# Patient Record
Sex: MALE | Race: WHITE | NOT HISPANIC OR LATINO | Employment: FULL TIME | URBAN - METROPOLITAN AREA
[De-identification: names, ages, dates, MRNs, and addresses within clinical notes are randomized per-mention and may not be internally consistent; named-entity substitution may affect disease eponyms.]

---

## 2023-03-12 PROBLEM — M54.2 NECK PAIN: Status: ACTIVE | Noted: 2023-03-12

## 2023-03-12 PROBLEM — R80.9 PROTEINURIA: Status: ACTIVE | Noted: 2023-03-12

## 2023-03-12 PROBLEM — G47.00 INSOMNIA: Status: ACTIVE | Noted: 2023-03-12

## 2023-03-12 PROBLEM — D23.9 DYSPLASTIC NEVI: Status: ACTIVE | Noted: 2023-03-12

## 2023-03-12 PROBLEM — F41.9 ANXIETY: Status: ACTIVE | Noted: 2023-03-12

## 2023-03-12 PROBLEM — M62.838 CERVICAL PARASPINAL MUSCLE SPASM: Status: ACTIVE | Noted: 2023-03-12

## 2023-03-12 PROBLEM — F32.A MILD DEPRESSION: Status: ACTIVE | Noted: 2023-03-12

## 2023-03-12 PROBLEM — E78.5 HYPERLIPIDEMIA: Status: ACTIVE | Noted: 2023-03-12

## 2023-03-12 PROBLEM — E88.09 PROTEINS SERUM PLASMA LOW: Status: ACTIVE | Noted: 2023-03-12

## 2023-03-12 RX ORDER — PRAVASTATIN SODIUM 80 MG/1
1 TABLET ORAL DAILY
COMMUNITY
Start: 2019-06-21 | End: 2023-03-14 | Stop reason: SDDI

## 2023-03-14 ENCOUNTER — OFFICE VISIT (OUTPATIENT)
Dept: PRIMARY CARE | Facility: CLINIC | Age: 53
End: 2023-03-14
Payer: COMMERCIAL

## 2023-03-14 VITALS
RESPIRATION RATE: 18 BRPM | TEMPERATURE: 97 F | HEART RATE: 95 BPM | HEIGHT: 73 IN | WEIGHT: 172.2 LBS | BODY MASS INDEX: 22.82 KG/M2 | SYSTOLIC BLOOD PRESSURE: 137 MMHG | DIASTOLIC BLOOD PRESSURE: 92 MMHG | OXYGEN SATURATION: 98 %

## 2023-03-14 DIAGNOSIS — N45.3 ORCHITIS AND EPIDIDYMITIS: Primary | ICD-10-CM

## 2023-03-14 LAB
POC BILIRUBIN, URINE: NEGATIVE
POC BLOOD, URINE: NEGATIVE
POC COLOR, URINE: YELLOW
POC GLUCOSE, URINE: NEGATIVE MG/DL
POC KETONES, URINE: NEGATIVE MG/DL
POC NITRITE,URINE: NEGATIVE
POC PH, URINE: 5.5 PH
POC PROTEIN, URINE: NEGATIVE MG/DL
POC SPECIFIC GRAVITY, URINE: >=1.03
POC UROBILINOGEN, URINE: 2 EU/DL

## 2023-03-14 PROCEDURE — 81003 URINALYSIS AUTO W/O SCOPE: CPT | Performed by: FAMILY MEDICINE

## 2023-03-14 PROCEDURE — 99214 OFFICE O/P EST MOD 30 MIN: CPT | Performed by: FAMILY MEDICINE

## 2023-03-14 PROCEDURE — 1036F TOBACCO NON-USER: CPT | Performed by: FAMILY MEDICINE

## 2023-03-14 RX ORDER — SULFAMETHOXAZOLE AND TRIMETHOPRIM 800; 160 MG/1; MG/1
1 TABLET ORAL 2 TIMES DAILY
Qty: 60 TABLET | Refills: 0 | Status: SHIPPED | OUTPATIENT
Start: 2023-03-14 | End: 2023-04-18 | Stop reason: SINTOL

## 2023-03-14 ASSESSMENT — ENCOUNTER SYMPTOMS
DYSURIA: 0
CHILLS: 1
ABDOMINAL PAIN: 1
FEVER: 1

## 2023-03-14 NOTE — PROGRESS NOTES
"Subjective   Patient ID: Thomas Muñoz is a 52 y.o. male who presents for Testicle Pain (Does a lot of heavy lifting , had low fever for 5 days , swelling in right testicle and pain /).    Testicle Pain  The patient's primary symptoms include testicular pain. The current episode started 1 to 4 weeks ago. The problem occurs daily. The problem has been gradually improving. The pain is mild. Associated symptoms include abdominal pain, chills and a fever. Pertinent negatives include no dysuria, painful intercourse, urgency or urinary retention. The testicular pain affects the right testicle. There is swelling in the right testicle. The symptoms are aggravated by tactile pressure. He has tried OTC analgesics for the symptoms. The treatment provided mild relief. He is sexually active. There is no history of chlamydia, gonorrhea, HIV, kidney stones, prostatitis or syphilis.        Review of Systems   Constitutional:  Positive for chills and fever.   Gastrointestinal:  Positive for abdominal pain.   Genitourinary:  Positive for testicular pain. Negative for dysuria and urgency.       Objective   BP (!) 137/92   Pulse 95   Temp 36.1 °C (97 °F)   Resp 18   Ht 1.854 m (6' 1\")   Wt 78.1 kg (172 lb 3.2 oz)   SpO2 98%   BMI 22.72 kg/m²     Physical Exam  Vitals reviewed.   Constitutional:       Appearance: Normal appearance.   HENT:      Head: Normocephalic.      Right Ear: External ear normal.      Left Ear: External ear normal.      Nose: Nose normal.      Mouth/Throat:      Mouth: Mucous membranes are moist.   Eyes:      Conjunctiva/sclera: Conjunctivae normal.   Cardiovascular:      Rate and Rhythm: Regular rhythm.      Heart sounds: Normal heart sounds.   Pulmonary:      Effort: Pulmonary effort is normal.      Breath sounds: Normal breath sounds.   Abdominal:      General: Bowel sounds are normal.      Palpations: Abdomen is soft.   Genitourinary:     Comments: Evaluation of the scrotum revealed right with fullness " and fluid-filled superior aspect with testicle somewhat firm to touch in relation to the left testicle and enlarged and tenderness appreciated without hernia or inguinal lymphadenopathy bilaterally.  Musculoskeletal:         General: Normal range of motion.      Cervical back: Neck supple.   Skin:     General: Skin is warm and dry.   Neurological:      General: No focal deficit present.      Mental Status: He is alert and oriented to person, place, and time.   Psychiatric:         Behavior: Behavior normal.         Judgment: Judgment normal.         Assessment/Plan   Problem List Items Addressed This Visit          Genitourinary    Orchitis and epididymitis - Primary      Patient clinically has evidence of orchitis and epididymitis.  We will order an ultrasound and get some labs and placed on Bactrim.  Patient will follow-up in 10 to 14 days for recheck.         Relevant Medications    sulfamethoxazole-trimethoprim (Bactrim DS) 800-160 mg tablet    Other Relevant Orders    POCT UA Automated manually resulted    Urine Culture    Urinalysis Microscopic Only    CBC and Auto Differential    Sedimentation Rate    US scrotum

## 2023-03-14 NOTE — PATIENT INSTRUCTIONS
Please consider exercise program involving walking or some other form of aerobic activity 5 days weekly for 30 minutes... Let's also consider strengthening of large muscle groups like the abdominal muscles or shoulder muscles... Twice weekly with reps of 5/10/15 exercises and gradually increase strength.. This is not heavy strength training but light weight training... Sit ups or back exercise routine.. Please ask for handout if uncertain how to do..This  will help to strengthen your muscles which in turn will help you to lose weight.... You might ask what is the best diet available.. I would strongly encourage you to consider  Weight Watchers.. And as  your  fellow on  Weight Watchers physician attempting to  live this  LIFE  style  choice with you....  I will be glad to give you recommendations on what to eat.. Consider buying Eusebia bread.., barb bagle thin bread.. oikos yogurt... eggs  to eat as hard-boiled... Halo top ice cream for snack... All these are delicious foods which.. when eaten and  being compliant eating three  meals daily  breakfast lunch and dinner, drinking  64 ounces of water daily we will all win together !!!!!!!. This will be a means for you to lose weight... Consider also the smart phone bandar ... My plate.. Or My  fitness  pal..,  as additional possibilities for weight loss... Good  luceva Waldron!    Discussed medication side effects.  The  risk benefits and treatment options  discussed with patient.  Better or so we added his name at    Please schedule follow-up appointment based upon your improvement/failure to improve/chronic medical conditions and physician recommendations during office appointment at the .  For lesion, open ended    Patient advised to go to er if symptoms worsen or to call answering service, or to return to office for additional evaluation    This note was partially  generated using Dragon voice recognition and there may be incorrect words, wording,  spelling, or pronunciation errors that were not corrected prior to committing the note to the medical record.         Patient clinically has evidence of orchitis and epididymitis.  We will order an ultrasound and get some labs and placed on Bactrim.  Patient will follow-up in 10 to 14 days for recheck.

## 2023-03-14 NOTE — ASSESSMENT & PLAN NOTE
Patient clinically has evidence of orchitis and epididymitis.  We will order an ultrasound and get some labs and placed on Bactrim.  Patient will follow-up in 10 to 14 days for recheck.  Urine test  negative  nitrate    sg v 1.030

## 2023-04-03 ENCOUNTER — APPOINTMENT (OUTPATIENT)
Dept: PRIMARY CARE | Facility: CLINIC | Age: 53
End: 2023-04-03
Payer: COMMERCIAL

## 2023-04-04 ENCOUNTER — APPOINTMENT (OUTPATIENT)
Dept: PRIMARY CARE | Facility: CLINIC | Age: 53
End: 2023-04-04
Payer: COMMERCIAL

## 2023-04-14 ENCOUNTER — APPOINTMENT (OUTPATIENT)
Dept: PRIMARY CARE | Facility: CLINIC | Age: 53
End: 2023-04-14
Payer: COMMERCIAL

## 2023-04-18 ENCOUNTER — OFFICE VISIT (OUTPATIENT)
Dept: PRIMARY CARE | Facility: CLINIC | Age: 53
End: 2023-04-18
Payer: COMMERCIAL

## 2023-04-18 VITALS
WEIGHT: 171.4 LBS | SYSTOLIC BLOOD PRESSURE: 120 MMHG | HEART RATE: 84 BPM | BODY MASS INDEX: 22.72 KG/M2 | OXYGEN SATURATION: 96 % | RESPIRATION RATE: 18 BRPM | HEIGHT: 73 IN | DIASTOLIC BLOOD PRESSURE: 85 MMHG | TEMPERATURE: 97 F

## 2023-04-18 DIAGNOSIS — N45.3 ORCHITIS AND EPIDIDYMITIS: Primary | ICD-10-CM

## 2023-04-18 PROCEDURE — 99213 OFFICE O/P EST LOW 20 MIN: CPT | Performed by: FAMILY MEDICINE

## 2023-04-18 PROCEDURE — 96372 THER/PROPH/DIAG INJ SC/IM: CPT | Performed by: FAMILY MEDICINE

## 2023-04-18 PROCEDURE — 1036F TOBACCO NON-USER: CPT | Performed by: FAMILY MEDICINE

## 2023-04-18 RX ORDER — LEVOFLOXACIN 500 MG/1
500 TABLET, FILM COATED ORAL DAILY
Qty: 14 TABLET | Refills: 0 | Status: SHIPPED | OUTPATIENT
Start: 2023-04-18 | End: 2023-05-02 | Stop reason: SDUPTHER

## 2023-04-18 RX ORDER — CEFTRIAXONE 1 G/1
1 INJECTION, POWDER, FOR SOLUTION INTRAMUSCULAR; INTRAVENOUS ONCE
Status: COMPLETED | OUTPATIENT
Start: 2023-04-18 | End: 2023-04-18

## 2023-04-18 RX ADMIN — CEFTRIAXONE 1 G: 1 INJECTION, POWDER, FOR SOLUTION INTRAMUSCULAR; INTRAVENOUS at 16:16

## 2023-04-18 ASSESSMENT — ENCOUNTER SYMPTOMS
BACK PAIN: 0
FREQUENCY: 0
RECTAL PAIN: 0
DYSURIA: 0
ABDOMINAL PAIN: 0
DIFFICULTY URINATING: 0
FEVER: 0
NAUSEA: 0
VOMITING: 0
FLANK PAIN: 0

## 2023-04-18 NOTE — PATIENT INSTRUCTIONS
Please consider exercise program involving walking or some other form of aerobic activity 5 days weekly for 30 minutes... Let's also consider strengthening of large muscle groups like the abdominal muscles or shoulder muscles... Twice weekly with reps of 5/10/15 exercises and gradually increase strength.. This is not heavy strength training but light weight training... Sit ups or back exercise routine.. Please ask for handout if uncertain how to do..This  will help to strengthen your muscles which in turn will help you to lose weight.... You might ask what is the best diet available.. I would strongly encourage you to consider  Weight Watchers.. And as  your  fellow on  Weight Watchers physician attempting to  live this  LIFE  style  choice with you....  I will be glad to give you recommendations on what to eat.. Consider buying Eusebia bread.., barb bagle thin bread.. oikos yogurt... eggs  to eat as hard-boiled... Halo top ice cream for snack... All these are delicious foods which.. when eaten and  being compliant eating three  meals daily  breakfast lunch and dinner, drinking  64 ounces of water daily we will all win together !!!!!!!. This will be a means for you to lose weight... Consider also the smart phone bandar ... My plate.. Or My  fitness  pal..,  as additional possibilities for weight loss... Good  luceva Waldron!    Discussed medication side effects.  The  risk benefits and treatment options  discussed with patient.  Better or so we added his name at    Please schedule follow-up appointment based upon your improvement/failure to improve/chronic medical conditions and physician recommendations during office appointment at the .  For lesion, open ended    Patient advised to go to er if symptoms worsen or to call answering service, or to return to office for additional evaluation    This note was partially  generated using Dragon voice recognition and there may be incorrect words, wording,  spelling, or pronunciation errors that were not corrected prior to committing the note to the medical record.          Problem List Items Addressed This Visit          Genitourinary    Orchitis and epididymitis - Primary      v results of ultrasound of the scrotum showed small left-sided varicocele no hydrocele with thickened right epididymal body with increased vascularity suggestion of epididymitis and small left varicocele was noted...   Results of testing suggest epididymitis and orchitis.  We will now give him a shot of Rocephin 1 g along with Levaquin 500 mg daily x14 days.  Patient advised to recheck 10 to 14 days if not improving will send to urology and referral placed in chart                 Relevant Medications    cefTRIAXone (Rocephin) vial 1 g (Start on 4/18/2023  4:00 PM)    levoFLOXacin (Levaquin) 500 mg tablet    Other Relevant Orders    Referral to Urology

## 2023-04-18 NOTE — PROGRESS NOTES
"Subjective   Patient ID: Thomas Muñoz is a 52 y.o. male who presents for Follow-up.    Patient returns here reaction with sulfa antibiotic and developed rash over upper extremities and trunk and may be a face head neck with sunburn like sloughing of skin.  Testicle pain diminished somewhat but then returned he denies fever or vomiting but he has achiness in scrotum did have sonogram here to review test results..  Patient denies marital indiscretion both for himself and wife denies practicing anal intercourse.         Review of Systems   Constitutional:  Negative for fever.   Gastrointestinal:  Negative for abdominal pain, nausea, rectal pain and vomiting.   Genitourinary:  Negative for difficulty urinating, dysuria, flank pain, frequency and urgency.        Admits to pain in right testicle   Musculoskeletal:  Negative for back pain.       Objective   /85   Pulse 84   Temp 36.1 °C (97 °F)   Resp 18   Ht 1.854 m (6' 1\")   Wt 77.7 kg (171 lb 6.4 oz)   SpO2 96%   BMI 22.61 kg/m²     Physical Exam  Vitals reviewed.   Constitutional:       Appearance: Normal appearance.   HENT:      Head: Normocephalic.      Right Ear: External ear normal.      Left Ear: External ear normal.      Nose: Nose normal.      Mouth/Throat:      Mouth: Mucous membranes are moist.   Eyes:      Conjunctiva/sclera: Conjunctivae normal.   Cardiovascular:      Rate and Rhythm: Regular rhythm.      Heart sounds: Normal heart sounds.   Pulmonary:      Effort: Pulmonary effort is normal.      Breath sounds: Normal breath sounds.   Abdominal:      General: Bowel sounds are normal.      Palpations: Abdomen is soft.   Genitourinary:     Comments: Right scrotal fullness and epididymal tenderness with tenderness of the testicle minimally on the right without definite hardness and discussed sonogram results as well without inguinal lymphadenopathy or evidence of hernia.  Musculoskeletal:         General: Normal range of motion.      Cervical " back: Neck supple.   Skin:     General: Skin is warm and dry.   Neurological:      General: No focal deficit present.      Mental Status: He is alert and oriented to person, place, and time.   Psychiatric:         Behavior: Behavior normal.         Judgment: Judgment normal.       Assessment/Plan   Problem List Items Addressed This Visit          Genitourinary    Orchitis and epididymitis - Primary      v results of ultrasound of the scrotum showed small left-sided varicocele no hydrocele with thickened right epididymal body with increased vascularity suggestion of epididymitis and small left varicocele was noted...   Results of testing suggest epididymitis and orchitis.  We will now give him a shot of Rocephin 1 g along with Levaquin 500 mg daily x14 days.  Patient advised to recheck 10 to 14 days if not improving will send to urology and referral placed in chart                 Relevant Medications    cefTRIAXone (Rocephin) vial 1 g (Start on 4/18/2023  4:00 PM)    levoFLOXacin (Levaquin) 500 mg tablet    Other Relevant Orders    Referral to Urology

## 2023-04-24 ENCOUNTER — APPOINTMENT (OUTPATIENT)
Dept: PRIMARY CARE | Facility: CLINIC | Age: 53
End: 2023-04-24
Payer: COMMERCIAL

## 2023-05-02 ENCOUNTER — OFFICE VISIT (OUTPATIENT)
Dept: PRIMARY CARE | Facility: CLINIC | Age: 53
End: 2023-05-02
Payer: COMMERCIAL

## 2023-05-02 VITALS
OXYGEN SATURATION: 90 % | DIASTOLIC BLOOD PRESSURE: 82 MMHG | HEIGHT: 73 IN | HEART RATE: 98 BPM | RESPIRATION RATE: 18 BRPM | SYSTOLIC BLOOD PRESSURE: 110 MMHG | TEMPERATURE: 97 F | WEIGHT: 164.5 LBS | BODY MASS INDEX: 21.8 KG/M2

## 2023-05-02 DIAGNOSIS — N45.3 ORCHITIS AND EPIDIDYMITIS: Primary | ICD-10-CM

## 2023-05-02 PROCEDURE — 1036F TOBACCO NON-USER: CPT | Performed by: FAMILY MEDICINE

## 2023-05-02 PROCEDURE — 99213 OFFICE O/P EST LOW 20 MIN: CPT | Performed by: FAMILY MEDICINE

## 2023-05-02 RX ORDER — LEVOFLOXACIN 500 MG/1
500 TABLET, FILM COATED ORAL DAILY
Qty: 14 TABLET | Refills: 0 | Status: SHIPPED | OUTPATIENT
Start: 2023-05-02 | End: 2023-05-16

## 2023-05-02 NOTE — PATIENT INSTRUCTIONS
Please consider exercise program involving walking or some other form of aerobic activity 5 days weekly for 30 minutes... Let's also consider strengthening of large muscle groups like the abdominal muscles or shoulder muscles... Twice weekly with reps of 5/10/15 exercises and gradually increase strength.. This is not heavy strength training but light weight training... Sit ups or back exercise routine.. Please ask for handout if uncertain how to do..This  will help to strengthen your muscles which in turn will help you to lose weight.... You might ask what is the best diet available.. I would strongly encourage you to consider  Weight Watchers.. And as  your  fellow on  Weight Watchers physician attempting to  live this  LIFE  style  choice with you....  I will be glad to give you recommendations on what to eat.. Consider buying Eusebia bread.., barb bagle thin bread.. oikos yogurt... eggs  to eat as hard-boiled... Halo top ice cream for snack... All these are delicious foods which.. when eaten and  being compliant eating three  meals daily  breakfast lunch and dinner, drinking  64 ounces of water daily we will all win together !!!!!!!. This will be a means for you to lose weight... Consider also the smart phone bandar ... My plate.. Or My  fitness  pal..,  as additional possibilities for weight loss... Good  luceva Waldron!    Discussed medication side effects.  The  risk benefits and treatment options  discussed with patient.  Better or so we added his name at    Please schedule follow-up appointment based upon your improvement/failure to improve/chronic medical conditions and physician recommendations during office appointment at the .  For lesion, open ended    Patient advised to go to er if symptoms worsen or to call answering service, or to return to office for additional evaluation    This note was partially  generated using Dragon voice recognition and there may be incorrect words, wording,  spelling, or pronunciation errors that were not corrected prior to committing the note to the medical record.        v

## 2023-05-02 NOTE — ASSESSMENT & PLAN NOTE
Good improvement with treatment go ahead and give additional 2 weeks of Levaquin but would recommend..  And to hold off on this and see where things go and wear scrotal support with strenuous activity x4 to 8 weeks recommending

## 2023-05-22 ENCOUNTER — LAB (OUTPATIENT)
Dept: LAB | Facility: LAB | Age: 53
End: 2023-05-22
Payer: COMMERCIAL

## 2023-05-22 DIAGNOSIS — N45.3 ORCHITIS AND EPIDIDYMITIS: ICD-10-CM

## 2023-05-22 LAB
BASOPHILS (10*3/UL) IN BLOOD BY AUTOMATED COUNT: 0.02 X10E9/L (ref 0–0.1)
BASOPHILS/100 LEUKOCYTES IN BLOOD BY AUTOMATED COUNT: 0.5 % (ref 0–2)
CALCIUM OXALATE CRYSTALS, URINE: NORMAL /HPF
EOSINOPHILS (10*3/UL) IN BLOOD BY AUTOMATED COUNT: 0.05 X10E9/L (ref 0–0.7)
EOSINOPHILS/100 LEUKOCYTES IN BLOOD BY AUTOMATED COUNT: 1.3 % (ref 0–6)
ERYTHROCYTE DISTRIBUTION WIDTH (RATIO) BY AUTOMATED COUNT: 13 % (ref 11.5–14.5)
ERYTHROCYTE MEAN CORPUSCULAR HEMOGLOBIN CONCENTRATION (G/DL) BY AUTOMATED: 32.4 G/DL (ref 32–36)
ERYTHROCYTE MEAN CORPUSCULAR VOLUME (FL) BY AUTOMATED COUNT: 88 FL (ref 80–100)
ERYTHROCYTES (10*6/UL) IN BLOOD BY AUTOMATED COUNT: 5.39 X10E12/L (ref 4.5–5.9)
HEMATOCRIT (%) IN BLOOD BY AUTOMATED COUNT: 47.6 % (ref 41–52)
HEMOGLOBIN (G/DL) IN BLOOD: 15.4 G/DL (ref 13.5–17.5)
IMMATURE GRANULOCYTES/100 LEUKOCYTES IN BLOOD BY AUTOMATED COUNT: 0.3 % (ref 0–0.9)
LEUKOCYTES (10*3/UL) IN BLOOD BY AUTOMATED COUNT: 3.7 X10E9/L (ref 4.4–11.3)
LYMPHOCYTES (10*3/UL) IN BLOOD BY AUTOMATED COUNT: 0.89 X10E9/L (ref 1.2–4.8)
LYMPHOCYTES/100 LEUKOCYTES IN BLOOD BY AUTOMATED COUNT: 23.9 % (ref 13–44)
MONOCYTES (10*3/UL) IN BLOOD BY AUTOMATED COUNT: 0.36 X10E9/L (ref 0.1–1)
MONOCYTES/100 LEUKOCYTES IN BLOOD BY AUTOMATED COUNT: 9.7 % (ref 2–10)
MUCUS, URINE: NORMAL /LPF
NEUTROPHILS (10*3/UL) IN BLOOD BY AUTOMATED COUNT: 2.39 X10E9/L (ref 1.2–7.7)
NEUTROPHILS/100 LEUKOCYTES IN BLOOD BY AUTOMATED COUNT: 64.3 % (ref 40–80)
PLATELETS (10*3/UL) IN BLOOD AUTOMATED COUNT: 191 X10E9/L (ref 150–450)
RBC, URINE: 1 /HPF (ref 0–5)
SEDIMENTATION RATE, ERYTHROCYTE: <1 MM/H (ref 0–15)
WBC, URINE: <1 /HPF (ref 0–5)

## 2023-05-22 PROCEDURE — 85025 COMPLETE CBC W/AUTO DIFF WBC: CPT

## 2023-05-22 PROCEDURE — 85652 RBC SED RATE AUTOMATED: CPT

## 2023-05-22 PROCEDURE — 36415 COLL VENOUS BLD VENIPUNCTURE: CPT

## 2023-05-22 PROCEDURE — 81001 URINALYSIS AUTO W/SCOPE: CPT

## 2023-08-10 ENCOUNTER — OFFICE VISIT (OUTPATIENT)
Dept: PRIMARY CARE | Facility: CLINIC | Age: 53
End: 2023-08-10
Payer: COMMERCIAL

## 2023-08-10 VITALS
TEMPERATURE: 97 F | HEART RATE: 96 BPM | BODY MASS INDEX: 22.2 KG/M2 | WEIGHT: 173 LBS | SYSTOLIC BLOOD PRESSURE: 116 MMHG | OXYGEN SATURATION: 98 % | DIASTOLIC BLOOD PRESSURE: 78 MMHG | HEIGHT: 74 IN | RESPIRATION RATE: 18 BRPM

## 2023-08-10 DIAGNOSIS — Z12.5 SCREENING FOR PROSTATE CANCER: ICD-10-CM

## 2023-08-10 DIAGNOSIS — L21.9 SEBORRHEA: ICD-10-CM

## 2023-08-10 DIAGNOSIS — Z12.11 SCREEN FOR COLON CANCER: ICD-10-CM

## 2023-08-10 DIAGNOSIS — E55.9 VITAMIN D DEFICIENCY: ICD-10-CM

## 2023-08-10 DIAGNOSIS — H53.9 VISUAL CHANGES: ICD-10-CM

## 2023-08-10 DIAGNOSIS — F41.9 ANXIETY: ICD-10-CM

## 2023-08-10 DIAGNOSIS — E78.5 HYPERLIPIDEMIA, UNSPECIFIED HYPERLIPIDEMIA TYPE: ICD-10-CM

## 2023-08-10 DIAGNOSIS — Z00.00 HEALTHCARE MAINTENANCE: ICD-10-CM

## 2023-08-10 DIAGNOSIS — F32.A MILD DEPRESSION: Primary | ICD-10-CM

## 2023-08-10 PROCEDURE — 1036F TOBACCO NON-USER: CPT | Performed by: FAMILY MEDICINE

## 2023-08-10 PROCEDURE — 99213 OFFICE O/P EST LOW 20 MIN: CPT | Performed by: FAMILY MEDICINE

## 2023-08-10 PROCEDURE — 99396 PREV VISIT EST AGE 40-64: CPT | Performed by: FAMILY MEDICINE

## 2023-08-10 RX ORDER — TRIAMCINOLONE ACETONIDE 1 MG/G
CREAM TOPICAL 2 TIMES DAILY
Qty: 30 G | Refills: 0 | Status: SHIPPED | OUTPATIENT
Start: 2023-08-10 | End: 2023-12-04 | Stop reason: ALTCHOICE

## 2023-08-10 RX ORDER — KETOCONAZOLE 20 MG/G
CREAM TOPICAL DAILY
Qty: 60 G | Refills: 0 | Status: SHIPPED | OUTPATIENT
Start: 2023-08-10 | End: 2023-12-04 | Stop reason: ALTCHOICE

## 2023-08-10 RX ORDER — MIRTAZAPINE 7.5 MG/1
7.5 TABLET, FILM COATED ORAL NIGHTLY
Qty: 30 TABLET | Refills: 5 | Status: SHIPPED | OUTPATIENT
Start: 2023-08-10 | End: 2024-06-07 | Stop reason: WASHOUT

## 2023-08-10 ASSESSMENT — PATIENT HEALTH QUESTIONNAIRE - PHQ9
SUM OF ALL RESPONSES TO PHQ9 QUESTIONS 1 AND 2: 4
5. POOR APPETITE OR OVEREATING: NOT AT ALL
1. LITTLE INTEREST OR PLEASURE IN DOING THINGS: MORE THAN HALF THE DAYS
2. FEELING DOWN, DEPRESSED OR HOPELESS: MORE THAN HALF THE DAYS
SUM OF ALL RESPONSES TO PHQ QUESTIONS 1-9: 9
7. TROUBLE CONCENTRATING ON THINGS, SUCH AS READING THE NEWSPAPER OR WATCHING TELEVISION: NOT AT ALL
9. THOUGHTS THAT YOU WOULD BE BETTER OFF DEAD, OR OF HURTING YOURSELF: SEVERAL DAYS
8. MOVING OR SPEAKING SO SLOWLY THAT OTHER PEOPLE COULD HAVE NOTICED. OR THE OPPOSITE, BEING SO FIGETY OR RESTLESS THAT YOU HAVE BEEN MOVING AROUND A LOT MORE THAN USUAL: NOT AT ALL
6. FEELING BAD ABOUT YOURSELF - OR THAT YOU ARE A FAILURE OR HAVE LET YOURSELF OR YOUR FAMILY DOWN: MORE THAN HALF THE DAYS
3. TROUBLE FALLING OR STAYING ASLEEP OR SLEEPING TOO MUCH: SEVERAL DAYS
4. FEELING TIRED OR HAVING LITTLE ENERGY: SEVERAL DAYS

## 2023-08-10 NOTE — PROGRESS NOTES
Thomas Muñoz is a 52 y.o. male here today a periodic health exam.  I reviewed previous preventative health measures including screening tests, immunizations and labs.      HPI   Rash  chest    and tried ring worm  medicine   Would  like to restart  depression  med  CURRENT COMPLAINTS OR CONCERNS:   Emotional  stress .. Daughter moving  Mother  in law  and in home hospice...       PREVIOUS PREVENTATIVE HEALTH  Colonoscopy : NO  Cologuard : NO  Mammogram : N/A  Pap Test :  N/A  PSA : NO  Hepatitis C Antibody : NO  HIV Screening : NO  Prevnar : N/A  Tdap : YES -- DATE 6/21/.19  Shingrix : NO  Yearly Flu Shot : NO    CURRENT FINDINGS  Healthy Diet : YES  Exercise :  YES --  by working  Depression Issues : YES --  would   like to restart  meds  Alcohol Use : NO  Tobacco Use : NO        Current Outpatient Medications:     ketoconazole (NIZOral) 2 % cream, Apply topically once daily., Disp: 60 g, Rfl: 0    mirtazapine (Remeron) 7.5 mg tablet, Take 1 tablet (7.5 mg) by mouth once daily at bedtime., Disp: 30 tablet, Rfl: 5    triamcinolone (Kenalog) 0.1 % cream, Apply topically 2 times a day. Apply to affected area 1-2 times daily as needed. Avoid face and groin., Disp: 30 g, Rfl: 0    Past Medical History:   Diagnosis Date    Hyperlipidemia     Hypertension     Testicle pain        Past Surgical History:   Procedure Laterality Date    OTHER SURGICAL HISTORY  11/22/2019    Tonsillectomy       Family History   Problem Relation Name Age of Onset    Hyperlipidemia Father      Hypertension Father         Social History     Tobacco Use    Smoking status: Never    Smokeless tobacco: Never   Vaping Use    Vaping Use: Never used   Substance Use Topics    Alcohol use: Not Currently    Drug use: Never       Immunization History   Administered Date(s) Administered    Pfizer COVID-19 vaccine, bivalent, age 12 years and older (30 mcg/0.3 mL) 11/03/2022    Pfizer Purple Cap SARS-CoV-2 04/21/2021, 05/14/2021, 01/13/2022    Tdap vaccine,  age 10 years and older (BOOSTRIX) 10/05/2010, 06/21/2019   Constitutional; no fever no chills no recent weight gain and no recent weight loss.  eyes:; no loss of vision no discharge from the eyes and no itching of the eyes.  ENT; no neck pain symptoms no ear symptoms and no nasal symptoms.  Cardiovascular; no chest pain no palpitations and no lower extremity edema.  Respiratory; no shortness of breath no cough and no shortness of breath during exertion.  gastrointestinal; no abdominal pain no constipation no heartburn no vomiting no blood in stools and bowel movement frequency normal.  genitourinary; negative dysuria no hematuria and no pyuria.  Musculoskeletal; no arthralgias and no myalgias.  integumentary;.. yes skin lesions    chest  Neurologic. no headaches and no dizziness  hematologic/lymphatic; swollen glands no tendency for easy bleeding and no tendency for easy bruising  Psychiatric; no anxiety yes depression and no emotional problems.       Objective    Visit Vitals  /78   Pulse 96   Temp 36.1 °C (97 °F)   Resp 18       Physical Exam   Vitals: I have reviewed the vitals  General: Well-developed.  In no acute distress.  Eyes:   sclera nonicteric.  Conjunctiva not injected.  No discharge.   HEAD: Normocephalic, atraumatic.  HEENT   Mucous membranes moist.  Posterior oropharynx nonerythematous, no tonsillar exudates.      No cervical lymphadenopathy.  Cardio: Regular rate and rhythm.  No murmur, rub or gallop.  Pulmonary: Lungs clear to auscultation in all fields.  No accessory muscle use.  GI/: Normal active bowel sounds.  Soft, nontender.  No masses or organomegaly appreciated.  Musculoskeletal: No gross deformities appreciated.  Neuro: Alert, age-appropriate.  Normal muscle tone.  Moving all extremities.  Skin: No rash, bruises or lesions.     Assessment    1. Mild depression  CBC and Auto Differential, Comprehensive Metabolic Panel, Thyroid Stimulating Hormone, mirtazapine (Remeron) 7.5 mg tablet       2. Anxiety        3. Screen for colon cancer  Colonoscopy      4. Visual changes  Referral to Ophthalmology      5. Healthcare maintenance  Lipid Panel      6. Screening for prostate cancer  Prostate Specific Antigen, Screen      7. Seborrhea  ketoconazole (NIZOral) 2 % cream, triamcinolone (Kenalog) 0.1 % cream      See wrap-up discussed with patient his skin rash we will try creams 1 in the morning 1 at nighttime ..  For depression/insomnia anxiety low-dose Remeron will be started.    Patient has elevated issues we will check lab testing follow-up recheck reviewed testing 3 months and get colonoscopy.    Some visual issues we will get him with eye doctor.    Vitamins noted in wrap-up.  .

## 2023-08-10 NOTE — PATIENT INSTRUCTIONS
Please consider exercise program involving walking or some other form of aerobic activity 5 days weekly for 30 minutes... Let's also consider strengthening of large muscle groups like the abdominal muscles or shoulder muscles... Twice weekly with reps of 5/10/15 exercises and gradually increase strength.. This is not heavy strength training but light weight training... Sit ups or back exercise routine.. Please ask for handout if uncertain how to do..This  will help to strengthen your muscles which in turn will help you to lose weight.... You might ask what is the best diet available.. I would strongly encourage you to consider  Weight Watchers.. And as  your  fellow on  Weight Watchers physician attempting to  live this  LIFE  style  choice with you....  I will be glad to give you recommendations on what to eat.. Consider buying Eusebia bread.., barb bagle thin bread.. oikos yogurt... eggs  to eat as hard-boiled... Halo top ice cream for snack... All these are delicious foods which.. when eaten and  being compliant eating three  meals daily  breakfast lunch and dinner, drinking  64 ounces of water daily we will all win together !!!!!!!. This will be a means for you to lose weight... Consider also the smart phone bandar ... My plate.. Or My  fitness  pal..,  as additional possibilities for weight loss... Good  luceva Waldron!    Discussed medication side effects.  The  risk benefits and treatment options  discussed with patient.       Please schedule follow-up appointment based upon your improvement/failure to improve/chronic medical conditions and physician recommendations during office appointment at the .       Patient advised to go to er if symptoms worsen or to call answering service, or to return to office for additional evaluation    This note was partially  generated using Dragon voice recognition and there may be incorrect words, wording, spelling, or pronunciation errors that were not  corrected prior to committing the note to the medical record.   Please consider the following medications to help    mitigate  Covid during this time  Vitamin C 500 mg  TWICE daily  B complex daily  ZINC   30 - 50  MG  DAILY     VITAMIN D 3   200O IU DAILY        Multivitamin with zinc  Extra rest  Stress reduction  IN  SYMPTOMATIC  PATIENTS, MONITORING WITH  HOME PULSE OXIMETRY  IS RECOMMENDED..  AMBULATORY  DESATURATIONS BELOW  94%  SHOULD PROMPT HOSPITAL  ADMISSSION

## 2023-08-10 NOTE — ASSESSMENT & PLAN NOTE
Suspect treatment best approach would be to restart Remeron low-dose and discussed CBT suggestions

## 2023-11-08 ENCOUNTER — APPOINTMENT (OUTPATIENT)
Dept: PRIMARY CARE | Facility: CLINIC | Age: 53
End: 2023-11-08
Payer: COMMERCIAL

## 2023-11-15 ENCOUNTER — LAB (OUTPATIENT)
Dept: LAB | Facility: LAB | Age: 53
End: 2023-11-15
Payer: COMMERCIAL

## 2023-11-15 DIAGNOSIS — Z12.5 SCREENING FOR PROSTATE CANCER: ICD-10-CM

## 2023-11-15 DIAGNOSIS — Z00.00 HEALTHCARE MAINTENANCE: ICD-10-CM

## 2023-11-15 DIAGNOSIS — F32.A MILD DEPRESSION: ICD-10-CM

## 2023-11-15 DIAGNOSIS — E55.9 VITAMIN D DEFICIENCY: ICD-10-CM

## 2023-11-15 LAB
25(OH)D3 SERPL-MCNC: 29 NG/ML (ref 30–100)
ALBUMIN SERPL BCP-MCNC: 4.6 G/DL (ref 3.4–5)
ALP SERPL-CCNC: 51 U/L (ref 33–120)
ALT SERPL W P-5'-P-CCNC: 15 U/L (ref 10–52)
ANION GAP SERPL CALC-SCNC: 13 MMOL/L (ref 10–20)
AST SERPL W P-5'-P-CCNC: 16 U/L (ref 9–39)
BASOPHILS # BLD AUTO: 0.01 X10*3/UL (ref 0–0.1)
BASOPHILS NFR BLD AUTO: 0.3 %
BILIRUB SERPL-MCNC: 1 MG/DL (ref 0–1.2)
BUN SERPL-MCNC: 13 MG/DL (ref 6–23)
CALCIUM SERPL-MCNC: 9.3 MG/DL (ref 8.6–10.3)
CHLORIDE SERPL-SCNC: 104 MMOL/L (ref 98–107)
CHOLEST SERPL-MCNC: 211 MG/DL (ref 0–199)
CHOLESTEROL/HDL RATIO: 5.3
CO2 SERPL-SCNC: 28 MMOL/L (ref 21–32)
CREAT SERPL-MCNC: 0.98 MG/DL (ref 0.5–1.3)
EOSINOPHIL # BLD AUTO: 0.05 X10*3/UL (ref 0–0.7)
EOSINOPHIL NFR BLD AUTO: 1.3 %
ERYTHROCYTE [DISTWIDTH] IN BLOOD BY AUTOMATED COUNT: 12.1 % (ref 11.5–14.5)
GFR SERPL CREATININE-BSD FRML MDRD: >90 ML/MIN/1.73M*2
GLUCOSE SERPL-MCNC: 99 MG/DL (ref 74–99)
HCT VFR BLD AUTO: 47.3 % (ref 41–52)
HDLC SERPL-MCNC: 39.8 MG/DL
HGB BLD-MCNC: 15.9 G/DL (ref 13.5–17.5)
IMM GRANULOCYTES # BLD AUTO: 0.05 X10*3/UL (ref 0–0.7)
IMM GRANULOCYTES NFR BLD AUTO: 1.3 % (ref 0–0.9)
LDLC SERPL CALC-MCNC: 121 MG/DL
LYMPHOCYTES # BLD AUTO: 1.04 X10*3/UL (ref 1.2–4.8)
LYMPHOCYTES NFR BLD AUTO: 26.2 %
MCH RBC QN AUTO: 29.9 PG (ref 26–34)
MCHC RBC AUTO-ENTMCNC: 33.6 G/DL (ref 32–36)
MCV RBC AUTO: 89 FL (ref 80–100)
MONOCYTES # BLD AUTO: 0.3 X10*3/UL (ref 0.1–1)
MONOCYTES NFR BLD AUTO: 7.6 %
NEUTROPHILS # BLD AUTO: 2.52 X10*3/UL (ref 1.2–7.7)
NEUTROPHILS NFR BLD AUTO: 63.3 %
NON HDL CHOLESTEROL: 171 MG/DL (ref 0–149)
NRBC BLD-RTO: 0 /100 WBCS (ref 0–0)
PLATELET # BLD AUTO: 170 X10*3/UL (ref 150–450)
POTASSIUM SERPL-SCNC: 4.1 MMOL/L (ref 3.5–5.3)
PROT SERPL-MCNC: 6.7 G/DL (ref 6.4–8.2)
PSA SERPL-MCNC: 1.18 NG/ML
RBC # BLD AUTO: 5.32 X10*6/UL (ref 4.5–5.9)
SODIUM SERPL-SCNC: 141 MMOL/L (ref 136–145)
TRIGL SERPL-MCNC: 253 MG/DL (ref 0–149)
TSH SERPL-ACNC: 2.17 MIU/L (ref 0.44–3.98)
VLDL: 51 MG/DL (ref 0–40)
WBC # BLD AUTO: 4 X10*3/UL (ref 4.4–11.3)

## 2023-11-15 PROCEDURE — 80061 LIPID PANEL: CPT

## 2023-11-15 PROCEDURE — 85025 COMPLETE CBC W/AUTO DIFF WBC: CPT

## 2023-11-15 PROCEDURE — 36415 COLL VENOUS BLD VENIPUNCTURE: CPT

## 2023-11-15 PROCEDURE — 80053 COMPREHEN METABOLIC PANEL: CPT

## 2023-11-15 PROCEDURE — 82306 VITAMIN D 25 HYDROXY: CPT

## 2023-11-15 PROCEDURE — 84153 ASSAY OF PSA TOTAL: CPT

## 2023-11-15 PROCEDURE — 84443 ASSAY THYROID STIM HORMONE: CPT

## 2023-12-04 ENCOUNTER — OFFICE VISIT (OUTPATIENT)
Dept: PRIMARY CARE | Facility: CLINIC | Age: 53
End: 2023-12-04
Payer: COMMERCIAL

## 2023-12-04 VITALS
SYSTOLIC BLOOD PRESSURE: 120 MMHG | WEIGHT: 181 LBS | TEMPERATURE: 97.2 F | BODY MASS INDEX: 23.99 KG/M2 | OXYGEN SATURATION: 97 % | RESPIRATION RATE: 18 BRPM | HEART RATE: 85 BPM | HEIGHT: 73 IN | DIASTOLIC BLOOD PRESSURE: 85 MMHG

## 2023-12-04 DIAGNOSIS — G47.00 INSOMNIA, UNSPECIFIED TYPE: ICD-10-CM

## 2023-12-04 DIAGNOSIS — N45.3 ORCHITIS AND EPIDIDYMITIS: ICD-10-CM

## 2023-12-04 DIAGNOSIS — F41.9 ANXIETY: Primary | ICD-10-CM

## 2023-12-04 DIAGNOSIS — E78.5 HYPERLIPIDEMIA, UNSPECIFIED HYPERLIPIDEMIA TYPE: ICD-10-CM

## 2023-12-04 DIAGNOSIS — F32.A MILD DEPRESSION: ICD-10-CM

## 2023-12-04 PROBLEM — N50.811 TESTICULAR PAIN, RIGHT: Status: ACTIVE | Noted: 2023-12-04

## 2023-12-04 PROCEDURE — 99214 OFFICE O/P EST MOD 30 MIN: CPT | Performed by: FAMILY MEDICINE

## 2023-12-04 PROCEDURE — 1036F TOBACCO NON-USER: CPT | Performed by: FAMILY MEDICINE

## 2023-12-04 RX ORDER — SERTRALINE HYDROCHLORIDE 25 MG/1
25 TABLET, FILM COATED ORAL DAILY
Qty: 90 TABLET | Refills: 1 | Status: SHIPPED | OUTPATIENT
Start: 2023-12-04 | End: 2024-06-07 | Stop reason: WASHOUT

## 2023-12-04 RX ORDER — HYDROXYZINE HYDROCHLORIDE 10 MG/1
10 TABLET, FILM COATED ORAL 3 TIMES DAILY
Qty: 90 TABLET | Refills: 3 | Status: SHIPPED | OUTPATIENT
Start: 2023-12-04 | End: 2024-01-03

## 2023-12-04 NOTE — ASSESSMENT & PLAN NOTE
Hyperlipedemia    .. Risk     4.6 %  discuss  diet    mediterranean  diet  and Patient given my blood pressure log per [employer].  Advised to monitor and instructed diet i.e. Mediterranean and given handout  Advised to return for nursing visit blood pressure track and 2 to 4 weeks

## 2023-12-04 NOTE — PROGRESS NOTES
Subjective   Patient ID: Thomas Muñoz is a 53 y.o. male who presents for Follow-up.  HPI  Rash  is better    Testicle   is painful with  firewood ??    Depression  and still significant times and get  overwhelming anxiety    and still gets bad  news and ramped up  in  mind like panc attack ... Hightened  intensity    Wife  mother  passed   Review of Systems  cardiovascular:  no  palpitations or chest  pain  respiratory: no  shortness  of  breath  endocrine:  no polydipsia,  no polyuria  no  pain  with ejactulation   musculoskeletal:  no  myalgia.. no arthralgia  All other  systems discussed  negative   Objective   Physical Exam  general: alert oriented x three  HEENT hearing normal to voice  Neck supple  Lungs respirations non-labored.  Cardiovascular: no peripheral edema  Skin: warm and dry without rash  Psych: judgement and insight normal  Musculoskeletal:  ambulation normal,    lymph:negative cervical  LYMPADENOPATHY  thyroid: non palpable enlargement   Labs  Last 12 months   Lab on 11/15/2023   Component Date Value Ref Range Status    WBC 11/15/2023 4.0 (L)  4.4 - 11.3 x10*3/uL Final    nRBC 11/15/2023 0.0  0.0 - 0.0 /100 WBCs Final    RBC 11/15/2023 5.32  4.50 - 5.90 x10*6/uL Final    Hemoglobin 11/15/2023 15.9  13.5 - 17.5 g/dL Final    Hematocrit 11/15/2023 47.3  41.0 - 52.0 % Final    MCV 11/15/2023 89  80 - 100 fL Final    MCH 11/15/2023 29.9  26.0 - 34.0 pg Final    MCHC 11/15/2023 33.6  32.0 - 36.0 g/dL Final    RDW 11/15/2023 12.1  11.5 - 14.5 % Final    Platelets 11/15/2023 170  150 - 450 x10*3/uL Final    Neutrophils % 11/15/2023 63.3  40.0 - 80.0 % Final    Immature Granulocytes %, Automated 11/15/2023 1.3 (H)  0.0 - 0.9 % Final    Immature Granulocyte Count (IG) includes promyelocytes, myelocytes and metamyelocytes but does not include bands. Percent differential counts (%) should be interpreted in the context of the absolute cell counts (cells/UL).    Lymphocytes % 11/15/2023 26.2  13.0 - 44.0 %  Final    Monocytes % 11/15/2023 7.6  2.0 - 10.0 % Final    Eosinophils % 11/15/2023 1.3  0.0 - 6.0 % Final    Basophils % 11/15/2023 0.3  0.0 - 2.0 % Final    Neutrophils Absolute 11/15/2023 2.52  1.20 - 7.70 x10*3/uL Final    Percent differential counts (%) should be interpreted in the context of the absolute cell counts (cells/uL).    Immature Granulocytes Absolute, Au* 11/15/2023 0.05  0.00 - 0.70 x10*3/uL Final    Lymphocytes Absolute 11/15/2023 1.04 (L)  1.20 - 4.80 x10*3/uL Final    Monocytes Absolute 11/15/2023 0.30  0.10 - 1.00 x10*3/uL Final    Eosinophils Absolute 11/15/2023 0.05  0.00 - 0.70 x10*3/uL Final    Basophils Absolute 11/15/2023 0.01  0.00 - 0.10 x10*3/uL Final    Glucose 11/15/2023 99  74 - 99 mg/dL Final    Sodium 11/15/2023 141  136 - 145 mmol/L Final    Potassium 11/15/2023 4.1  3.5 - 5.3 mmol/L Final    Chloride 11/15/2023 104  98 - 107 mmol/L Final    Bicarbonate 11/15/2023 28  21 - 32 mmol/L Final    Anion Gap 11/15/2023 13  10 - 20 mmol/L Final    Urea Nitrogen 11/15/2023 13  6 - 23 mg/dL Final    Creatinine 11/15/2023 0.98  0.50 - 1.30 mg/dL Final    eGFR 11/15/2023 >90  >60 mL/min/1.73m*2 Final    Calculations of estimated GFR are performed using the 2021 CKD-EPI Study Refit equation without the race variable for the IDMS-Traceable creatinine methods.  https://jasn.asnjournals.org/content/early/2021/09/22/ASN.1285818305    Calcium 11/15/2023 9.3  8.6 - 10.3 mg/dL Final    Albumin 11/15/2023 4.6  3.4 - 5.0 g/dL Final    Alkaline Phosphatase 11/15/2023 51  33 - 120 U/L Final    Total Protein 11/15/2023 6.7  6.4 - 8.2 g/dL Final    AST 11/15/2023 16  9 - 39 U/L Final    Bilirubin, Total 11/15/2023 1.0  0.0 - 1.2 mg/dL Final    ALT 11/15/2023 15  10 - 52 U/L Final    Patients treated with Sulfasalazine may generate falsely decreased results for ALT.    Thyroid Stimulating Hormone 11/15/2023 2.17  0.44 - 3.98 mIU/L Final    Cholesterol 11/15/2023 211 (H)  0 - 199 mg/dL Final          Age       Desirable   Borderline High   High     0-19 Y     0 - 169       170 - 199     >/= 200    20-24 Y     0 - 189       190 - 224     >/= 225         >24 Y     0 - 199       200 - 239     >/= 240   **All ranges are based on fasting samples. Specific   therapeutic targets will vary based on patient-specific   cardiac risk.    Pediatric guidelines reference:Pediatrics 2011, 128(S5).Adult guidelines reference: NCEP ATPIII Guidelines,MEHREEN 2001, 258:5526-97    Venipuncture immediately after or during the administration of Metamizole may lead to falsely low results. Testing should be performed immediately prior to Metamizole dosing.    HDL-Cholesterol 11/15/2023 39.8  mg/dL Final      Age       Very Low   Low     Normal    High    0-19 Y    < 35      < 40     40-45     ----  20-24 Y    ----     < 40      >45      ----        >24 Y      ----     < 40     40-60      >60      Cholesterol/HDL Ratio 11/15/2023 5.3   Final      Ref Values  Desirable  < 3.4  High Risk  > 5.0    LDL Calculated 11/15/2023 121 (H)  <=99 mg/dL Final                                Near   Borderline      AGE      Desirable  Optimal    High     High     Very High     0-19 Y     0 - 109     ---    110-129   >/= 130     ----    20-24 Y     0 - 119     ---    120-159   >/= 160     ----      >24 Y     0 -  99   100-129  130-159   160-189     >/=190      VLDL 11/15/2023 51 (H)  0 - 40 mg/dL Final    Triglycerides 11/15/2023 253 (H)  0 - 149 mg/dL Final       Age         Desirable   Borderline High   High     Very High   0 D-90 D    19 - 174         ----         ----        ----  91 D- 9 Y     0 -  74        75 -  99     >/= 100      ----    10-19 Y     0 -  89        90 - 129     >/= 130      ----    20-24 Y     0 - 114       115 - 149     >/= 150      ----         >24 Y     0 - 149       150 - 199    200- 499    >/= 500    Venipuncture immediately after or during the administration of Metamizole may lead to falsely low results. Testing should be performed  immediately prior to Metamizole dosing.    Non HDL Cholesterol 11/15/2023 171 (H)  0 - 149 mg/dL Final          Age       Desirable   Borderline High   High     Very High     0-19 Y     0 - 119       120 - 144     >/= 145    >/= 160    20-24 Y     0 - 149       150 - 189     >/= 190      ----         >24 Y    30 mg/dL above LDL Cholesterol goal      Prostate Specific Antigen,Screen 11/15/2023 1.18  <=4.00 ng/mL Final    Vitamin D, 25-Hydroxy, Total 11/15/2023 29 (L)  30 - 100 ng/mL Final   Lab on 05/22/2023   Component Date Value Ref Range Status    WBC 05/22/2023 3.7 (L)  4.4 - 11.3 x10E9/L Final    RBC 05/22/2023 5.39  4.50 - 5.90 x10E12/L Final    Hemoglobin 05/22/2023 15.4  13.5 - 17.5 g/dL Final    Hematocrit 05/22/2023 47.6  41.0 - 52.0 % Final    MCV 05/22/2023 88  80 - 100 fL Final    MCHC 05/22/2023 32.4  32.0 - 36.0 g/dL Final    Platelets 05/22/2023 191  150 - 450 x10E9/L Final    RDW 05/22/2023 13.0  11.5 - 14.5 % Final    Neutrophils % 05/22/2023 64.3  40.0 - 80.0 % Final    Immature Granulocytes %, Automated 05/22/2023 0.3  0.0 - 0.9 % Final     Immature Granulocyte Count (IG) includes promyelocytes,    myelocytes and metamyelocytes but does not include bands.   Percent differential counts (%) should be interpreted in the   context of the absolute cell counts (cells/L).    Lymphocytes % 05/22/2023 23.9  13.0 - 44.0 % Final    Monocytes % 05/22/2023 9.7  2.0 - 10.0 % Final    Eosinophils % 05/22/2023 1.3  0.0 - 6.0 % Final    Basophils % 05/22/2023 0.5  0.0 - 2.0 % Final    Neutrophils Absolute 05/22/2023 2.39  1.20 - 7.70 x10E9/L Final    Lymphocytes Absolute 05/22/2023 0.89 (L)  1.20 - 4.80 x10E9/L Final    Monocytes Absolute 05/22/2023 0.36  0.10 - 1.00 x10E9/L Final    Eosinophils Absolute 05/22/2023 0.05  0.00 - 0.70 x10E9/L Final    Basophils Absolute 05/22/2023 0.02  0.00 - 0.10 x10E9/L Final    Sedimentation Rate 05/22/2023 <1  0 - 15 mm/h Final    Please note new reference ranges as of  5/9/2022.    WBC, Urine 05/22/2023 <1  0 - 5 /HPF Final    RBC, Urine 05/22/2023 1  0 - 5 /HPF Final    Mucus, Urine 05/22/2023 3+  /LPF Final    Calcium Oxalate Crystals, Urine 05/22/2023 1+  /HPF Final   Office Visit on 03/14/2023   Component Date Value Ref Range Status    POC Color, Urine 03/14/2023 Yellow  Straw, Yellow, Light Yellow Final    POC Specific Gravity, Urine 03/14/2023 >=1.030  1.005 - 1.035 Final    POC PH, Urine 03/14/2023 5.5  No Reference Range Established PH Final    POC Protein, Urine 03/14/2023 NEGATIVE  NEGATIVE, 30 (1+) mg/dl Final    POC Glucose, Urine 03/14/2023 NEGATIVE  NEGATIVE mg/dl Final    POC Blood, Urine 03/14/2023 NEGATIVE  NEGATIVE Final    POC Ketones, Urine 03/14/2023 NEGATIVE  NEGATIVE mg/dl Final    POC Bilirubin, Urine 03/14/2023 NEGATIVE  NEGATIVE Final    POC Urobilinogen, Urine 03/14/2023 2.0 (A)  0.2, 1.0 EU/DL Final    Poc Nitrite, Urine 03/14/2023 NEGATIVE  NEGATIVE Final         Assessment/Plan   Problem List Items Addressed This Visit       Anxiety - Primary     To  psychology /psychiatry          Hyperlipidemia     Hyperlipedemia    .. Risk     4.6 %  discuss  diet    mediterranean  diet  and Patient given my blood pressure log per American [employer].  Advised to monitor and instructed diet i.e. Mediterranean and given handout  Advised to return for nursing visit blood pressure track and 2 to 4 weeks          Insomnia     Add  hydroxyzine          Mild depression     To  psychology / psychiatry         Anxiety  added  low dose sertraline  25  mg  ..

## 2024-02-12 NOTE — ASSESSMENT & PLAN NOTE
v results of ultrasound of the scrotum showed small left-sided varicocele no hydrocele with thickened right epididymal body with increased vascularity suggestion of epididymitis and small left varicocele was noted...   Results of testing suggest epididymitis and orchitis.  We will now give him a shot of Rocephin 1 g along with Levaquin 500 mg daily x14 days.  Patient advised to recheck 10 to 14 days if not improving will send to urology and referral placed in chart           no headache

## 2024-05-06 ENCOUNTER — APPOINTMENT (OUTPATIENT)
Dept: PRIMARY CARE | Facility: CLINIC | Age: 54
End: 2024-05-06
Payer: COMMERCIAL

## 2024-05-13 ENCOUNTER — OFFICE VISIT (OUTPATIENT)
Dept: BEHAVIORAL HEALTH | Facility: CLINIC | Age: 54
End: 2024-05-13
Payer: COMMERCIAL

## 2024-05-13 VITALS
WEIGHT: 167 LBS | HEART RATE: 75 BPM | DIASTOLIC BLOOD PRESSURE: 86 MMHG | HEIGHT: 74 IN | BODY MASS INDEX: 21.43 KG/M2 | SYSTOLIC BLOOD PRESSURE: 129 MMHG

## 2024-05-13 DIAGNOSIS — G47.9 SLEEP DISTURBANCE: ICD-10-CM

## 2024-05-13 DIAGNOSIS — F41.9 ANXIETY: Primary | ICD-10-CM

## 2024-05-13 DIAGNOSIS — F32.A MILD DEPRESSION: ICD-10-CM

## 2024-05-13 PROCEDURE — 99205 OFFICE O/P NEW HI 60 MIN: CPT

## 2024-05-13 RX ORDER — TRAZODONE HYDROCHLORIDE 50 MG/1
50 TABLET ORAL NIGHTLY
Qty: 30 TABLET | Refills: 0 | Status: SHIPPED | OUTPATIENT
Start: 2024-05-13 | End: 2024-06-12

## 2024-05-13 RX ORDER — BUSPIRONE HYDROCHLORIDE 7.5 MG/1
7.5 TABLET ORAL 2 TIMES DAILY
Qty: 60 TABLET | Refills: 2 | Status: SHIPPED | OUTPATIENT
Start: 2024-05-13 | End: 2024-05-30

## 2024-05-13 ASSESSMENT — ANXIETY QUESTIONNAIRES
7. FEELING AFRAID AS IF SOMETHING AWFUL MIGHT HAPPEN: NOT AT ALL
4. TROUBLE RELAXING: NEARLY EVERY DAY
5. BEING SO RESTLESS THAT IT IS HARD TO SIT STILL: NOT AT ALL
2. NOT BEING ABLE TO STOP OR CONTROL WORRYING: NEARLY EVERY DAY
1. FEELING NERVOUS, ANXIOUS, OR ON EDGE: NEARLY EVERY DAY
IF YOU CHECKED OFF ANY PROBLEMS ON THIS QUESTIONNAIRE, HOW DIFFICULT HAVE THESE PROBLEMS MADE IT FOR YOU TO DO YOUR WORK, TAKE CARE OF THINGS AT HOME, OR GET ALONG WITH OTHER PEOPLE: VERY DIFFICULT
GAD7 TOTAL SCORE: 13
6. BECOMING EASILY ANNOYED OR IRRITABLE: SEVERAL DAYS
3. WORRYING TOO MUCH ABOUT DIFFERENT THINGS: NEARLY EVERY DAY

## 2024-05-13 NOTE — PROGRESS NOTES
HPI  Thomas Muñoz is a 53 y.o. male patient with a CC Anxiety and Sleeping Problem presenting to outpatient treatment for a scheduled psych outpatient psychiatric evaluation.   Pt identify self by name, , and address     Reports a hx of anxiety and depression for over a decade. First struggled with symptoms while going marital problems, went to counseling and saw other providers. He was tried of several medications and settled on Zoloft which he has taken on/off over the years and felt stable on it. Last took Zoloft several months ago, managed by his PCP, Arnold Waldron DO but is no longer taking because it's ineffective and worsens his insomnia.    Reports recent stressors including daughter being dx with Crohn's disease, deteriorating mother-in-law's health who finally passed away in 10/2023. States his wife continue to stress with managing the health of her father which also wears him. States his daughter was recently dx with POTS in addition to his Crohn's. 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     States he took Xanax in the past and found it helpful. States he is aware this was not for long term use and looking for a more long term solution management for his anxiety.     Current S/Sx:  -Mood swings: denies  -Depressive mood: intermitting depression lasting up to 3-4 days at a time per week often based on daughter's cormobid condition including flare up of her Crohn's/POTS or wife stressors.   -Fatigue/Energy: mild to moderate mind fatigue  -Feeling hope/help/worthless: sometimes  -Sleep: sleeps about 6 hours/night, takes Hydroxyzine and Xanax (prescribed a long time ago).   -Motivation: lacks motivation  -Appetite/Weight Changes: good appetite, no weight changes  -Psychosis: denies hallucinations/delusions  -SI/HI: Passive thoughts, but denies current suicidal intent/plan  -Guns/Weapons at home: in safe  keeping     -Worry excessively: present, impactful  -Difficulty controlling worry: present, impactful  -Easily fatigued d/t worry: present, impactful  -Poor concentration d/t worry: present, impactful  -Restless / feeling on edge d/t worry: present, impactful    EARLE: 13/21     Panic attacks  Denies recent     HISTORY  PSYCH HISTORY  -Psych Hx: depression, anxiety, insomnia  -Psych Hospitalization Hx: denies  -Suicide Attempt Hx: denies  -Self-Harm/Violence Hx: denies  -Current psych meds: Hydroxyzine 10 mg 3 times/day (Takes 1 tab at bedtime to help with sleep)  -Psych Med Hx: Zoloft 25 mg (caused insomnia), Mirtazapine (ineffective)     SUBSTANCE USE HISTORY  -Substance Use Hx: denies  -ETOH: denies  -Tobacco: denies  -Caffeine: pop, 1-2 during the day  -Substance Abuse Treatment Hx: denies     FAMILY HISTORY  -Family Psych Hx: mother: some mental issues, daughter: depression/anxiety since 7  -Family Suicide Hx: denies  -Family Substance Abuse Hx: denies     SOCIAL HISTORY  -Upbringing: Grew up with both parents. Has 2 brothers  -Support system: good support system  -Trauma: some emotional   -Education: Masters  -Work: , self employed  -Marital Status:   -Children: 1 daughter  -Living situation: house with wife and daughter  -: denies  -Legal: denies      MEDICAL HISTORY  -PCP: Arnold Waldron DO  -TBI/head trauma/LOC/seizure hx: denies     REVIEW OF SYSTEMS  Review of Systems     PHYSICAL EXAM  Physical Exam     IMPRESSION  Anxiety and Sleeping Problem     Notes moderate anxiety and mild depression r/t family, work, and environmental stressors. Notes intermittent irritable mood. No hallucinations/delusion/sumaya/hypomania/SI reported. Appetite is average and sleeps disturbance is present.  Notes he takes previously prescribed Hydroxyzine and Xanax that was prescribed a long time ago to help with sleep. Notes he wants his anxiety managed first then if depressive symptoms remain  present, we can address them later.     SI/HI ASSESSMENT  -Risk Assessment: Thomas Muñoz is currently a medium chronic risk of suicide and self-harm due to no past suicide attempt(s) and is currently endorsing intermittent passive thoughts of suicide.   -Suicidal Risk Factors: , male, and access to weapons  -Protective Factors: strong coping skills, sense of responsibility towards family, positive family relationships, hopefulness/future orientation, marriage/partnership, and employment  -Plan to Reduce Risk: increase coping skills .     PLAN  Reviewed diagnostic impression including subjective and objective data and provided education about depression, Anxiety, Panic attacks, and Sleep disturbance, etiology, treatment recommendations including medication, therapy, course of treatment and prognosis. Patient amenable to treatment plan.      Dx: Anxiety and Sleeping Problem  START Buspirone 7.5 mg BID  START Trazodone 50 mg at bedtime prn for sleep.   CONTINUE Hydroxyzine 10 mg TID prn for severe anxiety     Reviewed r/b/a, possible side effects of the medication. Client is aware about the benefit outweighs the risk.     Psychotherapy:     Labs reviewed    -Follow-up with this provider in 3 weeks.    - Follow up with physical health providers as scheduled  -Risks/benefits/assessment of medication interventions discussed with pt; pt agreeable to plan. Will continue to monitor for symptoms mgmt and SEs and adjust plan as needed.  -MI to increase coping skills/behavior regulation.  -Safety plan reviewed.  -Call  Psychiatry at [phone] with issues.  -For [facility] residents, [employer] is a 24/7 hotline you can call for assistance at [phone]. Please call 911 or go to your closest Emergency Room if you feel worse. This includes thoughts of hurting yourself or anyone else, or having other troubles such as hearing voices, seeing visions, or having new and scary thoughts about the people  around you.

## 2024-05-30 DIAGNOSIS — F41.9 ANXIETY: ICD-10-CM

## 2024-05-30 RX ORDER — BUSPIRONE HYDROCHLORIDE 15 MG/1
15 TABLET ORAL 2 TIMES DAILY
Qty: 60 TABLET | Refills: 2 | Status: SHIPPED | OUTPATIENT
Start: 2024-05-30 | End: 2024-08-28

## 2024-06-07 ENCOUNTER — OFFICE VISIT (OUTPATIENT)
Dept: PRIMARY CARE | Facility: CLINIC | Age: 54
End: 2024-06-07
Payer: COMMERCIAL

## 2024-06-07 VITALS
TEMPERATURE: 97 F | BODY MASS INDEX: 21.84 KG/M2 | HEART RATE: 70 BPM | WEIGHT: 170.2 LBS | OXYGEN SATURATION: 97 % | DIASTOLIC BLOOD PRESSURE: 76 MMHG | SYSTOLIC BLOOD PRESSURE: 116 MMHG | RESPIRATION RATE: 18 BRPM | HEIGHT: 74 IN

## 2024-06-07 DIAGNOSIS — G47.00 INSOMNIA, UNSPECIFIED TYPE: Primary | ICD-10-CM

## 2024-06-07 DIAGNOSIS — N45.3 ORCHITIS AND EPIDIDYMITIS: ICD-10-CM

## 2024-06-07 DIAGNOSIS — Z12.5 SCREENING FOR PROSTATE CANCER: ICD-10-CM

## 2024-06-07 DIAGNOSIS — R80.9 PROTEINURIA, UNSPECIFIED TYPE: ICD-10-CM

## 2024-06-07 DIAGNOSIS — Z12.11 SCREENING FOR COLON CANCER: ICD-10-CM

## 2024-06-07 PROCEDURE — 1036F TOBACCO NON-USER: CPT | Performed by: FAMILY MEDICINE

## 2024-06-07 PROCEDURE — 99213 OFFICE O/P EST LOW 20 MIN: CPT | Performed by: FAMILY MEDICINE

## 2024-06-07 NOTE — PATIENT INSTRUCTIONS

## 2024-06-07 NOTE — ASSESSMENT & PLAN NOTE
Per    psychology    34 year old female, , currently 22 weeks pregnant, who presents with nausea and vomiting that began last night. Had several episodes of diarrhea x4 days, however, has improved today. Patient with decrease PO intake today. +sick contacts at home. Denies fever, chills, chest pain, SOB, abdominal pain, urinary/bowel complaints, back pain, vaginal bleeding/discharge, rash. Patient has been tolerating PO at home. Follows with Dr Bucio, OB/GYN, last seen x1 week ago, next appt plan for 2/10.

## 2024-06-07 NOTE — PROGRESS NOTES
Subjective   Patient ID: Thomas Muñoz is a 53 y.o. male who presents for Follow-up.  HPI  Went to  psychiatrist  .. Nurse practitioner...  a lot of stress  .. Daughter with crohns....heartbeat      elevated   .    Daughter with  pots  syndrome.  Daughter condition worsened  and he suffers with stress  and anxiety   Started on buspirone  ..   Testicular  pain   .. Started a little bit  and gone  Back    ie testicular pain  and went away   .  Gu  ... No  pain  with  ejactulation   Review of Systems  All other  pertinent  systems reviewed and are negative except  those  mentioned  in HPI   Objective   Physical Exam  general: alert oriented x three  HEENT hearing normal to voice  Neck supple  Lungs respirations non-labored.  Cardiovascular: no peripheral edema  Skin: warm and dry without rash  Psych: judgement and insight normal  Musculoskeletal:  ambulation normal,    lymph:negative cervical  LYMPADENOPATHY  thyroid: non palpable enlargement   Labs     Contains abnormal data Vitamin D, Total  Order: [id number]   Status: Final result       Visible to patient: Yes (seen)       Dx: Vitamin D deficiency    5 Result Notes      Component  Ref Range & Units 6 mo ago   Vitamin D, 25-Hydroxy, Total  30 - 100 ng/mL 29 Low    Resulting Agency [facility]              Narrative  Performed by: [facility]  Deficiency:         < 20   ng/ml  Insufficiency:      20-29  ng/ml  Sufficiency:         ng/ml  This assay accurately quantifies t         Prostate Specific Antigen, Screen  Order: [id number]   Status: Final result       Visible to patient: Yes (seen)       Dx: Screening for prostate cancer    5 Result Notes      Component  Ref Range & Units 6 mo ago   Prostate Specific Antigen,Screen  <=4.00 ng/mL 1.18   Resulting Agency [facility]              Narrative  Performed by: [facility]  The FDA requires that the method used for PSA assay be reported to the physician. Values obtained with different assay methods must not be used interchangeably. This test was  performed at [facility] using the Access Hybritech PSA assay is a two-site immunoenzymatic sandwich assay. The assay is approved for measurement of prostate-specific antigen (PSA)in serum and may be used          Contains abnormal data Lipid Panel  Order: [id number]   Status: Final result       Visible to patient: Yes (seen)       Dx: Healthcare maintenance    5 Result Notes       1 HM Topic      Component  Ref Range & Units 6 mo ago   Cholesterol  0 - 199 mg/dL 211 High    Comment:      Age      Desirable   Borderline High   High     0-19 Y     0 - 169       170 - 199     >/= 200    20-24 Y     0 - 189       190 - 224     >/= 225        >24 Y     0 - 199       200 - 239     >/= 240   **All ranges are based on fasting samples. Specific   therapeutic targets will vary based on patient-specific   cardiac risk.    Pediatric guidelines reference:Pediatrics 2011, 128(S5).Adult guidelines reference: NCEP ATPIII Guidelines,MEHREEN 2001, 258:2486-97    Venipuncture immediately after or during the administration of Metamizole may lead to falsely low results. Testing should be performed immediately prior to Metamizole dosing.   HDL-Cholesterol  mg/dL 39.8   Comment:  Age       Very Low   Low     Normal    High    0-19 Y    < 35      < 40     40-45     ----  20-24 Y    ----     < 40      >45      ----        >24 Y      ----     < 40     40-60      >60   Cholesterol/HDL Ratio 5.3   Comment:  Ref Values  Desirable  < 3.4  High Risk  > 5.0   LDL Calculated  <=99 mg/dL 121 High    Comment:                            Near   Borderline      AGE      Desirable  Optimal    High     High     Very High     0-19 Y     0 - 109     ---    110-129   >/= 130     ----    20-24 Y     0 - 119     ---    120-159   >/= 160     ----      >24 Y     0 -  99   100-129  130-159   160-189     >/=190   VLDL  0 - 40 mg/dL 51 High    Triglycerides  0 - 149 mg/dL 253 High    Comment:   Age         Desirable   Borderline High   High     Very  High   0 D-90 D    19 - 174         ----         ----        ----  91 D- 9 Y     0 -  74        75 -  99     >/= 100      ----    10-19 Y     0 -  89        90 - 129     >/= 130      ----    20-24 Y     0 - 114       115 - 149     >/= 150      ----        >24 Y     0 - 149       150 - 199    200- 499    >/= 500    Venipuncture immediately after or during the administration of Metamizole may lead to falsely low results. Testing should be performed immediately prior to Metamizole dosing.   Non HDL Cholesterol  0 - 149 mg/dL 171 High    Comment:      Age       Desirable   Borderline High   High     Very High     0-19 Y     0 - 119       120 - 144     >/= 145    >/= 160    20-24 Y     0 - 149       150 - 189     >/= 190      ----        >24 Y    30 mg/dL above LDL Cholesterol goal   Resulting Agency [facility]          Assessment/Plan   Problem List Items Addressed This Visit       Insomnia - Primary     Per    psychology          Proteinuria    Orchitis and epididymitis     Use jock  with hard work           Other Visit Diagnoses       Screening for colon cancer        Relevant Orders    Cologuard® colon cancer screening    Screening for prostate cancer        Relevant Orders    Prostate Specific Antigen, Screen

## 2024-06-12 ENCOUNTER — APPOINTMENT (OUTPATIENT)
Dept: BEHAVIORAL HEALTH | Facility: CLINIC | Age: 54
End: 2024-06-12
Payer: COMMERCIAL

## 2024-06-12 DIAGNOSIS — G47.9 SLEEP DISTURBANCE: ICD-10-CM

## 2024-06-12 DIAGNOSIS — F41.9 ANXIETY: ICD-10-CM

## 2024-06-12 DIAGNOSIS — F32.A MILD DEPRESSION: ICD-10-CM

## 2024-06-12 PROCEDURE — 99214 OFFICE O/P EST MOD 30 MIN: CPT

## 2024-06-12 RX ORDER — BUSPIRONE HYDROCHLORIDE 15 MG/1
15 TABLET ORAL 3 TIMES DAILY
Qty: 60 TABLET | Refills: 2 | Status: SHIPPED | OUTPATIENT
Start: 2024-06-12 | End: 2024-08-11

## 2024-06-12 RX ORDER — TRAZODONE HYDROCHLORIDE 100 MG/1
50-100 TABLET ORAL NIGHTLY PRN
Qty: 30 TABLET | Refills: 2 | Status: SHIPPED | OUTPATIENT
Start: 2024-06-12 | End: 2024-09-10

## 2024-06-12 NOTE — PROGRESS NOTES
HPI  Thomas Muñoz is a 53 y.o. male patient with a CC Anxiety, Depression, Sleeping Problem, and Med Management presenting to outpatient treatment for a scheduled psych outpatient psychiatric evaluation.   Pt identify self by name, , and address     2024  Reports some improvement since starting Buspirone but anxiety is not optimal, able to spread medication to control anxiety which helps. Denies hallucinations/delusion/sumaya/hypomania/SI. Sleep disturbance resulting from anxiety remain present. Denies any noticeable side effects from the medication    Current S/Sx:  -Mood swings: denies  -Depressive mood: intermitting depression lasting up to 3-4 days at a time per week often based on daughter's cormobid condition including flare up of her Crohn's/POTS or wife stressors.   -Fatigue/Energy: mild to moderate mind fatigue  -Feeling hope/help/worthless: sometimes  -Sleep: sleeps about 6 hours/night, takes Hydroxyzine and Xanax (prescribed a long time ago).   -Motivation: lacks motivation  -Appetite/Weight Changes: good appetite, no weight changes  -Psychosis: denies hallucinations/delusions  -SI/HI: Passive thoughts, but denies current suicidal intent/plan  -Guns/Weapons at home: in safe keeping     -Worry excessively: present, impactful  -Difficulty controlling worry: present, impactful  -Easily fatigued d/t worry: present, impactful  -Poor concentration d/t worry: present, impactful  -Restless / feeling on edge d/t worry: present, impactful    EARLE:      Panic attacks  Denies recent     HISTORY  PSYCH HISTORY  -Psych Hx: depression, anxiety, insomnia  -Psych Hospitalization Hx: denies  -Suicide Attempt Hx: denies  -Self-Harm/Violence Hx: denies  -Current psych meds: Hydroxyzine 10 mg 3 times/day (Takes 1 tab at bedtime to help with sleep)  -Psych Med Hx: Zoloft 25 mg (caused insomnia), Mirtazapine (ineffective)     SUBSTANCE USE HISTORY  -Substance Use Hx: denies  -ETOH: denies  -Tobacco:  denies  -Caffeine: pop, 1-2 during the day  -Substance Abuse Treatment Hx: denies     FAMILY HISTORY  -Family Psych Hx: mother: some mental issues, daughter: depression/anxiety since 9  -Family Suicide Hx: denies  -Family Substance Abuse Hx: denies     SOCIAL HISTORY  -Upbringing: Grew up with both parents. Has 2 brothers  -Support system: good support system  -Trauma: some emotional   -Education: Masters  -Work: , self employed  -Marital Status:   -Children: 1 daughter  -Living situation: house with wife and daughter  -: denies  -Legal: denies      MEDICAL HISTORY  -PCP: Arnold Waldron, DO  -TBI/head trauma/LOC/seizure hx: denies     REVIEW OF SYSTEMS  Review of Systems     PHYSICAL EXAM  Physical Exam     IMPRESSION  Anxiety, Depression, Sleeping Problem, and Med Management     Notes improve anxiety but not optimal despite spreading medication throughout the day to mitigate anxiety symptoms. Mood is mostly stable. Denies panic attacks. No hallucinations/delusion/sumaya/hypomania/SI reported. Appetite is average and sleeps disturbance remain present. No side effects or substance use concerns noted at this time.     SI/HI ASSESSMENT  -Risk Assessment: Thomas Muñoz is currently a medium chronic risk of suicide and self-harm due to no past suicide attempt(s) and is currently endorsing intermittent passive thoughts of suicide.   -Suicidal Risk Factors: , male, and access to weapons  -Protective Factors: strong coping skills, sense of responsibility towards family, positive family relationships, hopefulness/future orientation, marriage/partnership, and employment  -Plan to Reduce Risk: increase coping skills .     PLAN  Reviewed diagnostic impression including subjective and objective data and provided education about depression, Anxiety, Panic attacks, and Sleep disturbance, etiology, treatment recommendations including medication, therapy, course of treatment and prognosis.  Patient amenable to treatment plan.      Dx: Anxiety, Depression, Sleeping Problem, and Med Management  INCREASE Buspirone 15 mg TID  INCREASE Trazodone 100 mg, Take 0.5-1 tabs at bedtime prn for sleep.   CONTINUE Hydroxyzine 10 mg TID prn for severe anxiety     Reviewed r/b/a, possible side effects of the medication. Client is aware about the benefit outweighs the risk.     Psychotherapy:     Labs reviewed    -Follow-up with this provider in 6 weeks.    - Follow up with physical health providers as scheduled  -Risks/benefits/assessment of medication interventions discussed with pt; pt agreeable to plan. Will continue to monitor for symptoms mgmt and SEs and adjust plan as needed.  -MI to increase coping skills/behavior regulation.  -Safety plan reviewed.  -Call  Psychiatry at [phone] with issues.  -For [facility] residents, [employer] is a 24/7 hotline you can call for assistance at [phone]) 313-5709. Please call 911 or go to your closest Emergency Room if you feel worse. This includes thoughts of hurting yourself or anyone else, or having other troubles such as hearing voices, seeing visions, or having new and scary thoughts about the people around you.

## 2024-07-07 LAB — NONINV COLON CA DNA+OCC BLD SCRN STL QL: NEGATIVE

## 2024-07-24 ENCOUNTER — APPOINTMENT (OUTPATIENT)
Dept: BEHAVIORAL HEALTH | Facility: CLINIC | Age: 54
End: 2024-07-24
Payer: COMMERCIAL

## 2024-08-05 ENCOUNTER — APPOINTMENT (OUTPATIENT)
Dept: BEHAVIORAL HEALTH | Facility: CLINIC | Age: 54
End: 2024-08-05
Payer: COMMERCIAL

## 2024-08-05 VITALS
BODY MASS INDEX: 21.82 KG/M2 | HEART RATE: 80 BPM | SYSTOLIC BLOOD PRESSURE: 112 MMHG | WEIGHT: 170 LBS | DIASTOLIC BLOOD PRESSURE: 76 MMHG | HEIGHT: 74 IN

## 2024-08-05 DIAGNOSIS — F41.9 ANXIETY: ICD-10-CM

## 2024-08-05 DIAGNOSIS — F32.A MILD DEPRESSION: ICD-10-CM

## 2024-08-05 DIAGNOSIS — G47.9 SLEEP DISTURBANCE: ICD-10-CM

## 2024-08-05 PROCEDURE — 3008F BODY MASS INDEX DOCD: CPT

## 2024-08-05 PROCEDURE — 99214 OFFICE O/P EST MOD 30 MIN: CPT

## 2024-08-05 RX ORDER — HYDROXYZINE HYDROCHLORIDE 10 MG/1
10 TABLET, FILM COATED ORAL NIGHTLY PRN
Qty: 90 TABLET | Refills: 1 | Status: SHIPPED | OUTPATIENT
Start: 2024-08-05 | End: 2024-08-09 | Stop reason: SDUPTHER

## 2024-08-05 RX ORDER — BUSPIRONE HYDROCHLORIDE 15 MG/1
TABLET ORAL
Qty: 360 TABLET | Refills: 3 | Status: SHIPPED | OUTPATIENT
Start: 2024-08-05 | End: 2025-08-05

## 2024-08-05 RX ORDER — TRAZODONE HYDROCHLORIDE 100 MG/1
50-100 TABLET ORAL NIGHTLY PRN
Qty: 90 TABLET | Refills: 1 | Status: SHIPPED | OUTPATIENT
Start: 2024-08-05 | End: 2025-08-05

## 2024-08-05 RX ORDER — HYDROXYZINE HYDROCHLORIDE 10 MG/1
10 TABLET, FILM COATED ORAL NIGHTLY PRN
Qty: 90 TABLET | Refills: 1 | Status: SHIPPED | OUTPATIENT
Start: 2024-08-05 | End: 2024-08-05

## 2024-08-05 NOTE — PROGRESS NOTES
HPI  Thomas Muñoz is a 53 y.o. male patient with a CC Anxiety, Depression, Panic Attack, and Sleeping Problem presenting to outpatient treatment for a scheduled psych outpatient psychiatric evaluation.   Pt identify self by name, , and address     2024  Reports he's had a rough month since 2024, having to take daughter to hospital 3 times for a kidney stone, UTI, and a possible panic attack or POTS, states daughter has a cardio consult coming up soon through [facility]. States this has stressed him and made him feel anxious and somewhat unstable. States from morning to noon, anxiety is higher but he is able to ride through the rest of the day just find. States he continues to take Hydroxyzine and Trazodone to help with sleep and he finds that stabilizing. Appetite is good. Denies SI. Denies side effects. Denies substance use.  Denies hallucinations/delusion/sumaya/hypomania.    Current S/Sx:  -Mood swings: denies  -Depressive mood: intermitting depression lasting up to 3-4 days at a time per week often based on daughter's cormobid condition including flare up of her Crohn's/POTS or wife stressors.   -Fatigue/Energy: mild to moderate mind fatigue  -Feeling hope/help/worthless: sometimes  -Sleep: sleeps about 6 hours/night, takes Hydroxyzine and Xanax (prescribed a long time ago).   -Motivation: lacks motivation  -Appetite/Weight Changes: good appetite, no weight changes  -Psychosis: denies hallucinations/delusions  -SI/HI: Passive thoughts, but denies current suicidal intent/plan  -Guns/Weapons at home: in safe keeping     -Worry excessively: present, impactful  -Difficulty controlling worry: present, impactful  -Easily fatigued d/t worry: present, impactful  -Poor concentration d/t worry: present, impactful  -Restless / feeling on edge d/t worry: present, impactful    EARLE:      Panic attacks  Denies recent     HISTORY  PSYCH HISTORY  -Psych Hx: depression, anxiety, insomnia  -Psych Hospitalization Hx:  denies  -Suicide Attempt Hx: denies  -Self-Harm/Violence Hx: denies  -Current psych meds: Hydroxyzine 10 mg 3 times/day (Takes 1 tab at bedtime to help with sleep)  -Psych Med Hx: Zoloft 25 mg (caused insomnia), Mirtazapine (ineffective)     SUBSTANCE USE HISTORY  -Substance Use Hx: denies  -ETOH: denies  -Tobacco: denies  -Caffeine: pop, 1-2 during the day  -Substance Abuse Treatment Hx: denies     FAMILY HISTORY  -Family Psych Hx: mother: some mental issues, daughter: depression/anxiety since 9  -Family Suicide Hx: denies  -Family Substance Abuse Hx: denies     SOCIAL HISTORY  -Upbringing: Grew up with both parents. Has 2 brothers  -Support system: good support system  -Trauma: some emotional   -Education: Masters  -Work: , self employed  -Marital Status:   -Children: 1 daughter  -Living situation: house with wife and daughter  -: denies  -Legal: denies      MEDICAL HISTORY  -PCP: Arnold Waldron, DO  -TBI/head trauma/LOC/seizure hx: denies     REVIEW OF SYSTEMS  Review of Systems   All other systems reviewed and are negative.       PHYSICAL EXAM  Physical Exam  Psychiatric:         Attention and Perception: Attention and perception normal.         Mood and Affect: Mood and affect normal.         Speech: Speech normal.         Behavior: Behavior normal. Behavior is cooperative.         Thought Content: Thought content normal.         Cognition and Memory: Cognition and memory normal.         Judgment: Judgment normal.          IMPRESSION  Anxiety, Depression, Panic Attack, and Sleeping Problem     Notes improve anxiety with bouts of unstable anxiety resulting from stress r/t to daughter's frequent hospital visits for an infection and possible panic attacks. Depression is mostly stable. Mood is mostly stable. Denies panic attacks. No hallucinations/delusion/sumaya/hypomania/SI reported. Appetite and appetite are normal. No side effects or substance use concerns noted at this time.  Discussed to increase Buspirone     SI/HI ASSESSMENT  -Risk Assessment: Thomas Muñoz is currently a medium chronic risk of suicide and self-harm due to no past suicide attempt(s) and is currently endorsing intermittent passive thoughts of suicide.   -Suicidal Risk Factors: , male, and access to weapons  -Protective Factors: strong coping skills, sense of responsibility towards family, positive family relationships, hopefulness/future orientation, marriage/partnership, and employment  -Plan to Reduce Risk: increase coping skills .     PLAN  Reviewed diagnostic impression including subjective and objective data and provided education about depression, Anxiety, Panic attacks, and Sleep disturbance, etiology, treatment recommendations including medication, therapy, course of treatment and prognosis. Patient amenable to treatment plan.      Dx: Anxiety, Depression, Panic Attack, and Sleeping Problem  INCREASE Buspirone 15 mg, 2 tabs AM, 1 tab in afternoon, 1 tabs evening  CONTINUE Trazodone 100 mg, Take 0.5-1 tabs at bedtime prn for sleep.   CONTINUE Hydroxyzine 10 mg TID prn for severe anxiety     Reviewed r/b/a, possible side effects of the medication. Client is aware about the benefit outweighs the risk.     Psychotherapy:     Labs reviewed    -Follow-up with this provider in 8 weeks.    - Follow up with physical health providers as scheduled  -Risks/benefits/assessment of medication interventions discussed with pt; pt agreeable to plan. Will continue to monitor for symptoms mgmt and SEs and adjust plan as needed.  -MI to increase coping skills/behavior regulation.  -Safety plan reviewed.  -Call  Psychiatry at [phone] with issues.  -For [facility] residents, [employer] is a 24/7 hotline you can call for assistance at [phone] [phone]. Please call 911 or go to your closest Emergency Room if you feel worse. This includes thoughts of hurting yourself or anyone else, or having other troubles such  as hearing voices, seeing visions, or having new and scary thoughts about the people around you.

## 2024-08-09 RX ORDER — HYDROXYZINE HYDROCHLORIDE 10 MG/1
10-20 TABLET, FILM COATED ORAL NIGHTLY PRN
Qty: 180 TABLET | Refills: 1 | Status: SHIPPED | OUTPATIENT
Start: 2024-08-09 | End: 2025-08-09

## 2024-08-20 ENCOUNTER — APPOINTMENT (OUTPATIENT)
Dept: BEHAVIORAL HEALTH | Facility: CLINIC | Age: 54
End: 2024-08-20
Payer: COMMERCIAL

## 2024-09-30 ENCOUNTER — APPOINTMENT (OUTPATIENT)
Dept: BEHAVIORAL HEALTH | Facility: CLINIC | Age: 54
End: 2024-09-30
Payer: COMMERCIAL

## 2024-09-30 VITALS
HEART RATE: 79 BPM | TEMPERATURE: 98.5 F | BODY MASS INDEX: 23.91 KG/M2 | WEIGHT: 180.4 LBS | DIASTOLIC BLOOD PRESSURE: 75 MMHG | HEIGHT: 73 IN | SYSTOLIC BLOOD PRESSURE: 117 MMHG | RESPIRATION RATE: 18 BRPM

## 2024-09-30 DIAGNOSIS — G47.9 SLEEP DISTURBANCE: ICD-10-CM

## 2024-09-30 DIAGNOSIS — F41.9 ANXIETY: ICD-10-CM

## 2024-09-30 DIAGNOSIS — F32.A MILD DEPRESSION: ICD-10-CM

## 2024-09-30 PROCEDURE — 99214 OFFICE O/P EST MOD 30 MIN: CPT

## 2024-09-30 PROCEDURE — 3008F BODY MASS INDEX DOCD: CPT

## 2024-09-30 RX ORDER — VENLAFAXINE HYDROCHLORIDE 37.5 MG/1
CAPSULE, EXTENDED RELEASE ORAL
Qty: 56 CAPSULE | Refills: 1 | Status: SHIPPED | OUTPATIENT
Start: 2024-09-30 | End: 2024-11-04

## 2024-09-30 ASSESSMENT — PAIN SCALES - GENERAL: PAINLEVEL: 0-NO PAIN

## 2024-09-30 NOTE — PROGRESS NOTES
HPI  Thomas Muñoz is a 54 y.o. male patient with a CC Anxiety and Depression presenting to outpatient treatment for a scheduled psych outpatient psychiatric evaluation.   Pt identify self by name, , and address     2024  Reports no changes since the last visit with adjustment of Buspirone. Anxiety remains elevated, 7/10 about 4 days/week, 5/10 averagely. Report some depression too but not as significant as the anxiety. Sleeping about 5 hours soundly, then 2 - 3 broken hours/night. Appetite fluctuates but enough to meet full nutritional requirements.  Denies hallucinations/delusion/sumaya/hypomania. States he continues to take Hydroxyzine and Trazodone to help with sleep and he finds that stabilizing. Denies substance use. Denies any noticeable side effects from meds. States daughter's symptoms continue to fluctuates and she struggles with delayed motility but been managed for it via [facility].     Current S/Sx:  -Mood swings: denies  -Depressive mood: intermitting depression lasting up to 3-4 days at a time per week often based on daughter's cormobid condition including flare up of her Crohn's/POTS or wife stressors.   -Fatigue/Energy: mild to moderate mind fatigue  -Feeling hope/help/worthless: sometimes  -Sleep: sleeps about 6 hours/night, takes Hydroxyzine and Xanax (prescribed a long time ago).   -Motivation: lacks motivation  -Appetite/Weight Changes: good appetite, no weight changes  -Psychosis: denies hallucinations/delusions  -SI/HI: Passive thoughts, but denies current suicidal intent/plan  -Guns/Weapons at home: in safe keeping     -Worry excessively: present, impactful  -Difficulty controlling worry: present, impactful  -Easily fatigued d/t worry: present, impactful  -Poor concentration d/t worry: present, impactful  -Restless / feeling on edge d/t worry: present, impactful    EARLE:      Panic attacks  Denies recent     HISTORY  PSYCH HISTORY  -Psych Hx: depression, anxiety, insomnia  -Psych  Hospitalization Hx: denies  -Suicide Attempt Hx: denies  -Self-Harm/Violence Hx: denies  -Current psych meds: Hydroxyzine 10 mg 3 times/day (Takes 1 tab at bedtime to help with sleep)  -Psych Med Hx: Zoloft 25 mg (caused insomnia), Mirtazapine (ineffective), Wellbutrin (bad side effects), Depakote (severe insomnia)     SUBSTANCE USE HISTORY  -Substance Use Hx: denies  -ETOH: denies  -Tobacco: denies  -Caffeine: pop, 1-2 during the day  -Substance Abuse Treatment Hx: denies     FAMILY HISTORY  -Family Psych Hx: mother: some mental issues, daughter: depression/anxiety since 9  -Family Suicide Hx: denies  -Family Substance Abuse Hx: denies     SOCIAL HISTORY  -Upbringing: Grew up with both parents. Has 2 brothers  -Support system: good support system  -Trauma: some emotional   -Education: Masters  -Work: , self employed  -Marital Status:   -Children: 1 daughter  -Living situation: house with wife and daughter  -: denies  -Legal: denies      MEDICAL HISTORY  -PCP: Arnold Waldron, DO  -TBI/head trauma/LOC/seizure hx: denies     REVIEW OF SYSTEMS  Review of Systems   All other systems reviewed and are negative.       PHYSICAL EXAM  Physical Exam  Psychiatric:         Attention and Perception: Attention and perception normal.         Mood and Affect: Mood and affect normal.         Speech: Speech normal.         Behavior: Behavior normal. Behavior is cooperative.         Thought Content: Thought content normal.         Cognition and Memory: Cognition and memory normal.         Judgment: Judgment normal.          IMPRESSION  Anxiety and Depression    Notes no change to anxiety since the last visit and would like to try a new medication especially depression returned and slightly impactful. Daughter continue to struggle with her medical health issues with impacts his life. 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 Denies panic attacks.  Mood is mostly stable. No hallucinations/delusion/sumaya/hypomania/SI reported. Appetite and appetite fluctuates but mostly good. No side effects or substance use concerns noted at this time. Discussed to stop Buspirone, transitioned to Effexor     SI/HI ASSESSMENT  -Risk Assessment: Thomas Muñoz is currently a medium chronic risk of suicide and self-harm due to no past suicide attempt(s) and is currently endorsing intermittent passive thoughts of suicide.   -Suicidal Risk Factors: , male, and access to weapons  -Protective Factors: strong coping skills, sense of responsibility towards family, positive family relationships, hopefulness/future orientation, marriage/partnership, and employment  -Plan to Reduce Risk: increase coping skills .     PLAN  Reviewed diagnostic impression including subjective and objective data and provided education about depression, Anxiety, Panic attacks, and Sleep disturbance, etiology, treatment recommendations including medication, therapy, course of treatment and prognosis. Patient amenable to treatment plan.      Dx: Anxiety and Depression  STOP Buspirone 15 mg, 2 tabs AM, 1 tab in afternoon, 1 tabs evening  CONTINUE Trazodone 100 mg, Take 0.5-1 tabs at bedtime prn for sleep.   CONTINUE Hydroxyzine 10 mg TID prn for severe anxiety  START Effexor XR 37.5 mg, take 1 caps (37.5 mg) x 2 wks, then 2 caps (75 mg) daily until next visit     Reviewed r/b/a, possible side effects of the medication. Client is aware about the benefit outweighs the risk.     Psychotherapy: none    Labs reviewed    -Follow-up with this provider in 5 weeks.    - Follow up with physical health providers as scheduled  -Risks/benefits/assessment of medication interventions discussed with pt; pt agreeable to plan. Will continue to monitor for symptoms mgmt and SEs and adjust plan as needed.  -MI to increase coping skills/behavior regulation.  -Safety plan reviewed.  -Call  Psychiatry at [phone] with  issues.  -For [facility] residents, [employer] is a 24/7 hotline you can call for assistance at (282) 911-5283. Please call 911 or go to your closest Emergency Room if you feel worse. This includes thoughts of hurting yourself or anyone else, or having other troubles such as hearing voices, seeing visions, or having new and scary thoughts about the people around you.

## 2024-11-01 PROBLEM — M62.838 CERVICAL PARASPINAL MUSCLE SPASM: Status: RESOLVED | Noted: 2023-03-12 | Resolved: 2024-11-01

## 2024-11-01 PROBLEM — M54.2 NECK PAIN: Status: RESOLVED | Noted: 2023-03-12 | Resolved: 2024-11-01

## 2024-11-04 ENCOUNTER — APPOINTMENT (OUTPATIENT)
Dept: BEHAVIORAL HEALTH | Facility: CLINIC | Age: 54
End: 2024-11-04
Payer: COMMERCIAL

## 2024-11-04 VITALS — SYSTOLIC BLOOD PRESSURE: 109 MMHG | DIASTOLIC BLOOD PRESSURE: 70 MMHG | HEART RATE: 88 BPM

## 2024-11-04 DIAGNOSIS — F41.9 ANXIETY: ICD-10-CM

## 2024-11-04 DIAGNOSIS — F32.A MILD DEPRESSION: ICD-10-CM

## 2024-11-04 DIAGNOSIS — G47.9 SLEEP DISTURBANCE: ICD-10-CM

## 2024-11-04 PROCEDURE — 99214 OFFICE O/P EST MOD 30 MIN: CPT

## 2024-11-04 RX ORDER — VENLAFAXINE HYDROCHLORIDE 37.5 MG/1
CAPSULE, EXTENDED RELEASE ORAL
Qty: 120 CAPSULE | Refills: 1 | Status: SHIPPED | OUTPATIENT
Start: 2024-11-05 | End: 2024-12-17

## 2024-11-04 NOTE — PROGRESS NOTES
HPI  Thomas Muñoz is a 54 y.o. male patient with a CC Anxiety, MDD (Major Depressive Disorder), and Sleeping Problem presenting to outpatient treatment for a scheduled psych outpatient psychiatric evaluation.   Pt identify self by name, , and address     2024  Reports sleeping rekha since switching from buspirone to Effexor, however, anxiety has not quite improved, similar better on buspirone.  Continue to feel anxious several days a week with difficulty controlling.  Reports the presence of appetite, but does not seem to be getting adequate intake because he is not a good cook, and wife cooks, has extensive oral disease with frequent surgeries, and has debilitating health that affects her ability to cook, so patient tends to eat more junk food.  Otherwise, denies feeling depressed.  Denies panic attacks.  Denies substance use.  Denies any noticeable side effects from medication.  Denies hallucinations/delusions/sumaya/hypomania/SI.  For the most part, reports mild improvement but not optimal.    Current S/Sx:  -Mood swings: denies  -Depressive mood: intermitting depression lasting up to 3-4 days at a time per week often based on daughter's cormobid condition including flare up of her Crohn's/POTS or wife stressors.   -Fatigue/Energy: mild to moderate mind fatigue  -Feeling hope/help/worthless: sometimes  -Sleep: sleeps about 6 hours/night, takes Hydroxyzine and Xanax (prescribed a long time ago).   -Motivation: lacks motivation  -Appetite/Weight Changes: good appetite, no weight changes  -Psychosis: denies hallucinations/delusions  -SI/HI: Passive thoughts, but denies current suicidal intent/plan  -Guns/Weapons at home: in safe keeping     -Worry excessively: present, impactful  -Difficulty controlling worry: present, impactful  -Easily fatigued d/t worry: present, impactful  -Poor concentration d/t worry: present, impactful  -Restless / feeling on edge d/t worry: present, impactful    EARLE:      Panic  attacks  Denies recent     HISTORY  PSYCH HISTORY  -Psych Hx: depression, anxiety, insomnia  -Psych Hospitalization Hx: denies  -Suicide Attempt Hx: denies  -Self-Harm/Violence Hx: denies  -Current psych meds: Hydroxyzine 10 mg 3 times/day (Takes 1 tab at bedtime to help with sleep)  -Psych Med Hx: Zoloft 25 mg (caused insomnia), Mirtazapine (ineffective), Wellbutrin (bad side effects), Depakote (severe insomnia)     SUBSTANCE USE HISTORY  -Substance Use Hx: denies  -ETOH: denies  -Tobacco: denies  -Caffeine: pop, 1-2 during the day  -Substance Abuse Treatment Hx: denies     FAMILY HISTORY  -Family Psych Hx: mother: some mental issues, daughter: depression/anxiety since 7  -Family Suicide Hx: denies  -Family Substance Abuse Hx: denies     SOCIAL HISTORY  -Upbringing: Grew up with both parents. Has 2 brothers  -Support system: good support system  -Trauma: some emotional   -Education: Masters  -Work: , self employed  -Marital Status:   -Children: 1 daughter  -Living situation: house with wife and daughter  -: denies  -Legal: denies      MEDICAL HISTORY  -PCP: Arnold Waldron, DO  -TBI/head trauma/LOC/seizure hx: denies     REVIEW OF SYSTEMS  Review of Systems   All other systems reviewed and are negative.       PHYSICAL EXAM  Physical Exam  Psychiatric:         Attention and Perception: Attention and perception normal.         Mood and Affect: Mood and affect normal.         Speech: Speech normal.         Behavior: Behavior normal. Behavior is cooperative.         Thought Content: Thought content normal.         Cognition and Memory: Cognition and memory normal.         Judgment: Judgment normal.          IMPRESSION  Anxiety, MDD (Major Depressive Disorder), and Sleeping Problem    Reports mild improvement since switching from buspirone to Effexor during last visit.  Improvement is mostly consistent with sleep, but appetite remains below average due to lack of food presence in the house  and declining spousal health. No hallucinations/delusion/sumaya/hypomania/SI reported. . No side effects or substance use concerns noted at this time. Discussed discussed to increase Effexor dose to more effectively control anxiety.  Continue hydroxyzine and trazodone as needed for elevated anxiety and sleep respectively.     SI/HI ASSESSMENT  -Risk Assessment: Thomas Muñoz is currently a medium chronic risk of suicide and self-harm due to no past suicide attempt(s) and is currently endorsing intermittent passive thoughts of suicide.   -Suicidal Risk Factors: , male, and access to weapons  -Protective Factors: strong coping skills, sense of responsibility towards family, positive family relationships, hopefulness/future orientation, marriage/partnership, and employment  -Plan to Reduce Risk: increase coping skills .     PLAN  Reviewed diagnostic impression including subjective and objective data and provided education about depression, Anxiety, Panic attacks, and Sleep disturbance, etiology, treatment recommendations including medication, therapy, course of treatment and prognosis. Patient amenable to treatment plan.      Dx: Anxiety, MDD (Major Depressive Disorder), and Sleeping Problem  CONTINUE Trazodone 100 mg, Take 0.5-1 tabs at bedtime prn for sleep.   CONTINUE Hydroxyzine 10 mg TID prn for severe anxiety  INCREASE Effexor XR 37.5 mg, take 3 caps (112.5 mg) x 3 wks, then 4 caps (150 mg) daily until next visit     Reviewed r/b/a, possible side effects of the medication. Client is aware about the benefit outweighs the risk.     Psychotherapy: none    Labs reviewed    -Follow-up with this provider in 6 weeks.    - Follow up with physical health providers as scheduled  -Risks/benefits/assessment of medication interventions discussed with pt; pt agreeable to plan. Will continue to monitor for symptoms mgmt and SEs and adjust plan as needed.  -MI to increase coping skills/behavior regulation.  -Safety plan  reviewed.  -Call  Psychiatry at [phone] with issues.  -For [facility] residents, [employer] is a 24/7 hotline you can call for assistance at [phone]) 485-8474. Please call 911 or go to your closest Emergency Room if you feel worse. This includes thoughts of hurting yourself or anyone else, or having other troubles such as hearing voices, seeing visions, or having new and scary thoughts about the people around you.

## 2024-12-09 ENCOUNTER — APPOINTMENT (OUTPATIENT)
Dept: PRIMARY CARE | Facility: CLINIC | Age: 54
End: 2024-12-09
Payer: COMMERCIAL

## 2024-12-13 ENCOUNTER — TELEPHONE (OUTPATIENT)
Dept: PRIMARY CARE | Facility: CLINIC | Age: 54
End: 2024-12-13

## 2024-12-13 ENCOUNTER — APPOINTMENT (OUTPATIENT)
Dept: PRIMARY CARE | Facility: CLINIC | Age: 54
End: 2024-12-13
Payer: COMMERCIAL

## 2024-12-13 VITALS
HEART RATE: 88 BPM | WEIGHT: 171 LBS | DIASTOLIC BLOOD PRESSURE: 87 MMHG | SYSTOLIC BLOOD PRESSURE: 132 MMHG | OXYGEN SATURATION: 99 % | TEMPERATURE: 97 F | RESPIRATION RATE: 18 BRPM | BODY MASS INDEX: 22.66 KG/M2 | HEIGHT: 73 IN

## 2024-12-13 DIAGNOSIS — F32.A MILD DEPRESSION: ICD-10-CM

## 2024-12-13 DIAGNOSIS — Z12.5 SCREENING FOR PROSTATE CANCER: Primary | ICD-10-CM

## 2024-12-13 DIAGNOSIS — E78.49 OTHER HYPERLIPIDEMIA: ICD-10-CM

## 2024-12-13 DIAGNOSIS — N45.3 ORCHITIS AND EPIDIDYMITIS: ICD-10-CM

## 2024-12-13 PROCEDURE — 3008F BODY MASS INDEX DOCD: CPT | Performed by: FAMILY MEDICINE

## 2024-12-13 PROCEDURE — 99213 OFFICE O/P EST LOW 20 MIN: CPT | Performed by: FAMILY MEDICINE

## 2024-12-13 NOTE — TELEPHONE ENCOUNTER
Patient was seen 12/13/24 and was interested in getting the Blood Panel. He felt it was a sometime from the last Cholesterol Reading. Please add to MY Chart Message and Lab request. Thank you

## 2024-12-13 NOTE — PROGRESS NOTES
Subjective   Patient ID: Thomas Muñoz is a 54 y.o. male who presents for Follow-up.  HPI  Testicular  pain and  with squating down  had  pain  in testicle.  Patient noticed pain in the testicle was precipitated by wearing tight jeans.  He has reduced/stopped this and that bending over it seemed to cause irritation to the testicle and this was the factor while doing strenuous work and he no longer wears tight jeans with this type of activity.    Since last visit patient is now saying psychiatrist.  His diagnosis is predominantly sleep disturbance and mild anxiety.  With telemedicine visits he was started on buspirone for anxiety with EARLE-7 being 13 out of 21.  Buspirone has been increased to 15 3 times daily with trazodone at half to 1 tablet at bedtime for sleep.  He does state Atarax 10 mg 3 times daily for severe anxiety.  He was seen 8/5/2024 with increase in BuSpar to 30 mg in the morning 15 in the afternoon and 15 in the evening.  Advised to recheck in 2 months.  He was seen once again 930/24 combination of trazodone and venlafaxine seems to be helping he did stop his BuSpar with plan to increase medication.  Patient had increased Effexor to 3 caps daily all in all feels as if his symptoms are improving    Has not had labs done in quite some time would like to have lab testing performed.  Review of Systems  All other  pertinent  systems reviewed and are negative except  those  mentioned  in HPI   Objective   Physical Exam  general: alert oriented x three  HEENT hearing normal to voice  Neck supple  Lungs respirations non-labored.  Cardiovascular: no peripheral edema  Skin: warm and dry without rash  Psych: judgement and insight normal  Musculoskeletal:  ambulation normal,    lymph:negative cervical  LYMPADENOPATHY  thyroid: non palpable enlargement    Labs        Assessment/Plan   Problem List Items Addressed This Visit       Hyperlipidemia    Relevant Orders    Lipid Panel    Thyroid Stimulating Hormone     Mild depression     seems to be improving and managed by psychiatry encouraged to maintain compliance and lifestyle changes reviewed with patient including decreasing stress from multiple properties on         Relevant Orders    Comprehensive Metabolic Panel    CBC and Auto Differential    Orchitis and epididymitis     patient seems stable at this time with discovery of cause for irritation.  Continue to avoid trauma to scrotum/testicles          Other Visit Diagnoses       Screening for prostate cancer    -  Primary    Relevant Orders    Prostate Specific Antigen, Screen

## 2024-12-15 NOTE — ASSESSMENT & PLAN NOTE
patient seems stable at this time with discovery of cause for irritation.  Continue to avoid trauma to scrotum/testicles

## 2024-12-15 NOTE — ASSESSMENT & PLAN NOTE
seems to be improving and managed by psychiatry encouraged to maintain compliance and lifestyle changes reviewed with patient including decreasing stress from multiple properties on

## 2024-12-16 ENCOUNTER — APPOINTMENT (OUTPATIENT)
Dept: BEHAVIORAL HEALTH | Facility: CLINIC | Age: 54
End: 2024-12-16
Payer: COMMERCIAL

## 2025-01-07 DIAGNOSIS — F32.A MILD DEPRESSION: ICD-10-CM

## 2025-01-07 DIAGNOSIS — F41.9 ANXIETY: ICD-10-CM

## 2025-01-07 RX ORDER — VENLAFAXINE HYDROCHLORIDE 37.5 MG/1
150 CAPSULE, EXTENDED RELEASE ORAL DAILY
Qty: 120 CAPSULE | Refills: 0 | Status: SHIPPED | OUTPATIENT
Start: 2025-01-07 | End: 2025-02-06

## 2025-01-27 ENCOUNTER — APPOINTMENT (OUTPATIENT)
Dept: BEHAVIORAL HEALTH | Facility: CLINIC | Age: 55
End: 2025-01-27
Payer: COMMERCIAL

## 2025-02-17 ENCOUNTER — APPOINTMENT (OUTPATIENT)
Dept: BEHAVIORAL HEALTH | Facility: CLINIC | Age: 55
End: 2025-02-17
Payer: COMMERCIAL

## 2025-02-17 VITALS
SYSTOLIC BLOOD PRESSURE: 119 MMHG | RESPIRATION RATE: 18 BRPM | BODY MASS INDEX: 21.91 KG/M2 | DIASTOLIC BLOOD PRESSURE: 81 MMHG | HEART RATE: 101 BPM | HEIGHT: 74 IN | TEMPERATURE: 98.5 F | WEIGHT: 170.7 LBS

## 2025-02-17 DIAGNOSIS — F32.A MILD DEPRESSION: ICD-10-CM

## 2025-02-17 DIAGNOSIS — G47.9 SLEEP DISTURBANCE: ICD-10-CM

## 2025-02-17 DIAGNOSIS — F41.9 ANXIETY: ICD-10-CM

## 2025-02-17 PROCEDURE — 3008F BODY MASS INDEX DOCD: CPT

## 2025-02-17 PROCEDURE — 99214 OFFICE O/P EST MOD 30 MIN: CPT

## 2025-02-17 RX ORDER — VENLAFAXINE HYDROCHLORIDE 150 MG/1
150 CAPSULE, EXTENDED RELEASE ORAL DAILY
Qty: 90 CAPSULE | Refills: 1 | Status: SHIPPED | OUTPATIENT
Start: 2025-02-17 | End: 2025-08-16

## 2025-02-17 RX ORDER — HYDROXYZINE HYDROCHLORIDE 10 MG/1
10-20 TABLET, FILM COATED ORAL NIGHTLY PRN
Qty: 180 TABLET | Refills: 1 | Status: SHIPPED | OUTPATIENT
Start: 2025-02-17 | End: 2026-02-17

## 2025-02-17 ASSESSMENT — ANXIETY QUESTIONNAIRES
1. FEELING NERVOUS, ANXIOUS, OR ON EDGE: SEVERAL DAYS
5. BEING SO RESTLESS THAT IT IS HARD TO SIT STILL: NOT AT ALL
6. BECOMING EASILY ANNOYED OR IRRITABLE: NOT AT ALL
2. NOT BEING ABLE TO STOP OR CONTROL WORRYING: SEVERAL DAYS
7. FEELING AFRAID AS IF SOMETHING AWFUL MIGHT HAPPEN: NOT AT ALL
3. WORRYING TOO MUCH ABOUT DIFFERENT THINGS: SEVERAL DAYS
IF YOU CHECKED OFF ANY PROBLEMS ON THIS QUESTIONNAIRE, HOW DIFFICULT HAVE THESE PROBLEMS MADE IT FOR YOU TO DO YOUR WORK, TAKE CARE OF THINGS AT HOME, OR GET ALONG WITH OTHER PEOPLE: NOT DIFFICULT AT ALL
GAD7 TOTAL SCORE: 3
4. TROUBLE RELAXING: NOT AT ALL

## 2025-02-17 ASSESSMENT — PATIENT HEALTH QUESTIONNAIRE - PHQ9
1. LITTLE INTEREST OR PLEASURE IN DOING THINGS: SEVERAL DAYS
10. IF YOU CHECKED OFF ANY PROBLEMS, HOW DIFFICULT HAVE THESE PROBLEMS MADE IT FOR YOU TO DO YOUR WORK, TAKE CARE OF THINGS AT HOME, OR GET ALONG WITH OTHER PEOPLE: NOT DIFFICULT AT ALL
SUM OF ALL RESPONSES TO PHQ9 QUESTIONS 1 & 2: 2
2. FEELING DOWN, DEPRESSED OR HOPELESS: SEVERAL DAYS

## 2025-02-17 ASSESSMENT — PAIN SCALES - GENERAL: PAINLEVEL_OUTOF10: 0-NO PAIN

## 2025-02-17 NOTE — PROGRESS NOTES
Thomas Muñoz is a 54 y.o. male patient presenting for a in person FUV  Visit location: patient (Thomas, [place] office), provider (MARY Randall, [place] office)   Pt identify self by name, , and address     Chief Complaint   Patient presents with    Anxiety    Depression    Sleeping Problem    Follow-up    Med Management     HPI    2025  States he's been in a bad mood since last week because his wife contracted UTI and was hospitalized for over 1 week.  She continued to feel weak and seems to have a history of frequent UTIs.  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  Otherwise, reports mostly manage anxiety but not optimal on current dose of Effexor.  Reports anxiety during the first 2 hours of the day between 8 AM and 10 AM, but remains well-controlled throughout the day.  Denies panic attacks.  Sleep is good when taking trazodone and hydroxyzine to get.  Appetite is stable.  Denies any noticeable side effects from medication.  Denies substance use.  Denies SI/hallucinations/delusions/sumaya/hypomania.  Overall feels mostly controlled.    Current S/Sx:  -Mood swings: denies  -Depressive mood: Improving.  PHQ-9 (2)  -Fatigue/Energy: Improving  -Feeling hope/help/worthless: Denies  -Sleep: sleeps well  -Motivation: Improving  -Appetite/Weight Changes: good appetite, no weight changes  -Psychosis: denies hallucinations/delusions  -SI/HI: Denies denies current suicidal intent/plan  -Guns/Weapons at home: in safe keeping     -Worry excessively: Mostly managed except for the first 2 hours of the day  EARLE-7 (3)     Panic attacks  Denies recent     HISTORY  PSYCH HISTORY  -Psych Hx: depression, anxiety, insomnia  -Psych Hospitalization Hx: denies  -Suicide Attempt Hx: denies  -Self-Harm/Violence Hx: denies  -Current psych meds: Hydroxyzine 10 mg 3 times/day (Takes 1 tab at bedtime to help with sleep)  -Psych Med Hx: Zoloft 25 mg (caused insomnia), Mirtazapine  (ineffective), Wellbutrin (bad side effects), Depakote (severe insomnia)     SUBSTANCE USE HISTORY  -Substance Use Hx: denies  -ETOH: denies  -Tobacco: denies  -Caffeine: pop, 1-2 during the day  -Substance Abuse Treatment Hx: denies     FAMILY HISTORY  -Family Psych Hx: mother: some mental issues, daughter: depression/anxiety since 9  -Family Suicide Hx: denies  -Family Substance Abuse Hx: denies     SOCIAL HISTORY  -Upbringing: Grew up with both parents. Has 2 brothers  -Support system: good support system  -Trauma: some emotional   -Education: Masters  -Work: , self employed  -Marital Status:   -Children: 1 daughter  -Living situation: house with wife and daughter  -: denies  -Legal: denies      MEDICAL HISTORY  -PCP: Arnold Waldron,   -TBI/head trauma/LOC/seizure hx: denies     REVIEW OF SYSTEMS  Review of Systems   All other systems reviewed and are negative.       PHYSICAL EXAM  Physical Exam  Psychiatric:         Attention and Perception: Attention and perception normal.         Mood and Affect: Mood and affect normal.         Speech: Speech normal.         Behavior: Behavior normal. Behavior is cooperative.         Thought Content: Thought content normal.         Cognition and Memory: Cognition and memory normal.         Judgment: Judgment normal.          IMPRESSION  Anxiety, Depression, Sleeping Problem, Follow-up, and Med Management    Reports mostly manage anxiety symptoms except for the first 2 hours of the day.  Sleep and appetite are stable on current regimen.  Denies recent panic attacks or mood swings.  Reports feeling stressed in the last week because of wife's hospitalization from UTI and sepsis due to her procrastination to seek help in time.  Otherwise, reports mostly manage symptoms. No hallucinations/delusion/sumaya/hypomania/SI reported. No side effects or substance use concerns noted at this time. Discussed discussed to continue current dose of Effexor,  consider switching medication to bedtime to control anxiety during the first 2 hours of the day, monitor for insomnia.  Continue trazodone and hydroxyzine as before.  Follow-up in 8 weeks.       SI/HI ASSESSMENT  -Risk Assessment: Thomas Muñoz is currently a medium chronic risk of suicide and self-harm due to no past suicide attempt(s) and is currently endorsing intermittent passive thoughts of suicide.   -Suicidal Risk Factors: , male, and access to weapons  -Protective Factors: strong coping skills, sense of responsibility towards family, positive family relationships, hopefulness/future orientation, marriage/partnership, and employment  -Plan to Reduce Risk: increase coping skills .     PLAN  Reviewed diagnostic impression including subjective and objective data and provided education about depression, Anxiety, Panic attacks, and Sleep disturbance, etiology, treatment recommendations including medication, therapy, course of treatment and prognosis. Patient amenable to treatment plan.      Dx: Anxiety, Depression, Sleeping Problem, Follow-up, and Med Management  CONTINUE Trazodone 100 mg, Take 0.5-1 tabs at bedtime prn for sleep.   CONTINUE Hydroxyzine 10 mg TID prn for severe anxiety  CONTINUE Effexor XR 37.5 mg, take 3 caps (112.5 mg) x 3 wks, then 4 caps (150 mg) daily until next visit     Reviewed r/b/a, possible side effects of the medication. Client is aware about the benefit outweighs the risk.     Psychotherapy: none    Labs reviewed    -Follow-up with this provider in 8 weeks.    -Total time: 38 minutes via virtual    - Follow up with physical health providers as scheduled  -Risks/benefits/assessment of medication interventions discussed with pt; pt agreeable to plan. Will continue to monitor for symptoms mgmt and SEs and adjust plan as needed.  -MI to increase coping skills/behavior regulation.  -Safety plan reviewed.  -Call  Psychiatry at [phone] with issues.  -For [facility] [place] [facility] is a 24/7 hotline you can call for assistance at [phone]. Please call 911 or go to your closest Emergency Room if you feel worse. This includes thoughts of hurting yourself or anyone else, or having other troubles such as hearing voices, seeing visions, or having new and scary thoughts about the people around you.

## 2025-02-21 DIAGNOSIS — G47.9 SLEEP DISTURBANCE: ICD-10-CM

## 2025-02-24 RX ORDER — TRAZODONE HYDROCHLORIDE 100 MG/1
TABLET ORAL
Qty: 90 TABLET | Refills: 1 | Status: SHIPPED | OUTPATIENT
Start: 2025-02-24

## 2025-04-14 ENCOUNTER — APPOINTMENT (OUTPATIENT)
Dept: BEHAVIORAL HEALTH | Facility: CLINIC | Age: 55
End: 2025-04-14
Payer: COMMERCIAL

## 2025-04-14 DIAGNOSIS — G47.9 SLEEP DISTURBANCE: ICD-10-CM

## 2025-04-14 DIAGNOSIS — F32.A MILD DEPRESSION: ICD-10-CM

## 2025-04-14 DIAGNOSIS — F41.9 ANXIETY: ICD-10-CM

## 2025-04-14 PROCEDURE — 99214 OFFICE O/P EST MOD 30 MIN: CPT

## 2025-04-14 RX ORDER — LORAZEPAM 0.5 MG/1
0.5 TABLET ORAL EVERY 8 HOURS PRN
Qty: 15 TABLET | Refills: 0 | Status: SHIPPED | OUTPATIENT
Start: 2025-04-14 | End: 2026-04-14

## 2025-04-14 RX ORDER — VENLAFAXINE HYDROCHLORIDE 150 MG/1
150 CAPSULE, EXTENDED RELEASE ORAL DAILY
Qty: 90 CAPSULE | Refills: 1 | Status: SHIPPED | OUTPATIENT
Start: 2025-04-14 | End: 2025-10-11

## 2025-04-14 RX ORDER — VENLAFAXINE HYDROCHLORIDE 75 MG/1
75 CAPSULE, EXTENDED RELEASE ORAL DAILY
Qty: 90 CAPSULE | Refills: 1 | Status: SHIPPED | OUTPATIENT
Start: 2025-04-14 | End: 2025-10-11

## 2025-04-14 NOTE — PROGRESS NOTES
Thomas Muñoz is a 54 y.o. male patient presenting for a(an) in person FUV  Visit location: patient (Thomas Muñoz, [place] office), provider (MARY Randall, [place] office)  Pt identify self by name, , and address    Chief Complaint   Patient presents with    Anxiety    Depression    Panic Attack    Sleeping Problem    Follow-up    Med Management      HPI    2025  Reports ongoing anxiety especially in the mornings, switch to taking Effexor at bedtime to help improve morning anxiety, but has not had any luck.  Also reports ongoing mild depression.  Denies mood swings or panic attacks.  Sleep and appetite are normal, continue to take trazodone as before.  Also takes hydroxyzine at bedtime to help improve sleep.  Denies any noticeable side effects from medication.  Denies substance use.  Denies SI/hallucinations/delusions/sumaya/hypomania.    Current S/Sx:  -Mood swings: denies  -Depressive mood: Improving.  PHQ-9 (2)  -Fatigue/Energy: Improving  -Feeling hope/help/worthless: Denies  -Sleep: sleeps well  -Motivation: Improving  -Appetite/Weight Changes: good appetite, no weight changes  -Psychosis: denies hallucinations/delusions  -SI/HI: Denies denies current suicidal intent/plan  -Guns/Weapons at home: in safe keeping     -Worry excessively: Mostly managed except for the first 2 hours of the day  EARLE-7 (3)     Panic attacks  Denies recent     HISTORY  PSYCH HISTORY  -Psych Hx: depression, anxiety, insomnia  -Psych Hospitalization Hx: denies  -Suicide Attempt Hx: denies  -Self-Harm/Violence Hx: denies  -Current psych meds: Hydroxyzine 10 mg 3 times/day (Takes 1 tab at bedtime to help with sleep)  -Psych Med Hx: Zoloft 25 mg (caused insomnia), Mirtazapine (ineffective), Wellbutrin (bad side effects), Depakote (severe insomnia)     SUBSTANCE USE HISTORY  -Substance Use Hx: denies  -ETOH: denies  -Tobacco: denies  -Caffeine: pop, 1-2 during the day  -Substance Abuse Treatment Hx: denies      FAMILY HISTORY  -Family Psych Hx: mother: some mental issues, daughter: depression/anxiety since 7  -Family Suicide Hx: denies  -Family Substance Abuse Hx: denies     SOCIAL HISTORY  -Upbringing: Grew up with both parents. Has 2 brothers  -Support system: good support system  -Trauma: some emotional   -Education: Masters  -Work: , self employed  -Marital Status:   -Children: 1 daughter  -Living situation: house with wife and daughter  -: denies  -Legal: denies      MEDICAL HISTORY  -PCP: Arnold Waldron,   -TBI/head trauma/LOC/seizure hx: denies     REVIEW OF SYSTEMS  Review of Systems   All other systems reviewed and are negative.       PHYSICAL EXAM  Physical Exam  Psychiatric:         Attention and Perception: Attention and perception normal.         Mood and Affect: Mood and affect normal.         Speech: Speech normal.         Behavior: Behavior normal. Behavior is cooperative.         Thought Content: Thought content normal.         Cognition and Memory: Cognition and memory normal.         Judgment: Judgment normal.          IMPRESSION  FUV today via in person. Patient reports elevated anxiety in the morning about 5 out of 7 days a week that often last for several hours.  Notes he switched to taking Effexor at bedtime to help improve morning anxiety, but it was not effective.  Also reports ongoing mild depression.  Denies mood swings or panic attacks.  Sleeping and eating normally.  Continue to take trazodone and hydroxyzine at bedtime to improve sleep.  Denies any noticeable side effects from medication.  Denies substance use.  Denies SI/hallucinations/delusions/sumaya/hypomania.discussed to increase Effexor  mg daily, add Ativan 0.5 mg as needed for elevated morning anxiety, for short-term use only, continue trazodone and hydroxyzine as before.  Will follow up with this provider in 7 weeks to continue monitoring, or sooner if needed.      Plan:     1. Reviewed  diagnostic impression including subjective and objective data and provided education about depression, Anxiety, Panic attacks, and Sleep disturbance, etiology, treatment recommendations including medication, therapy, course of treatment and prognosis. Patient amenable to treatment plan.     2. Safety Assessment: History of no thoughts, but denies current thoughts of SI, plan/intent.  No h/o SA. RF include , male, and access to weapons. PF include  strong coping skills, sense of responsibility towards family, positive family relationships, hopefulness/future orientation, marriage/partnership, and employment, engaged in care, future oriented, reports the presence of guns at home, locked up and safety..  Low imminent risk     3. @  Problem List Items Addressed This Visit       Anxiety    Relevant Medications    LORazepam (Ativan) 0.5 mg tablet    venlafaxine XR (Effexor XR) 75 mg 24 hr capsule    venlafaxine XR (Effexor-XR) 150 mg 24 hr capsule    Mild depression    Relevant Medications    venlafaxine XR (Effexor XR) 75 mg 24 hr capsule    venlafaxine XR (Effexor-XR) 150 mg 24 hr capsule    Sleep disturbance      CONTINUE Trazodone 100 mg, Take 0.5-1 tabs at bedtime prn for sleep.   CONTINUE Hydroxyzine 10 mg TID prn for severe anxiety  INCREASE venlafaxine  mg daily (150 + 75 mg)  START lorazepam 0.5 mg daily as needed for severely elevated anxiety or panic attacks     Reviewed r/b/a, possible side effects of the medication. Client is aware about the benefit outweighs the risk.     4. INDIVIDUAL THERAPY: Not currently attending     5. OARRS: No meds filled in the last year     6. Labs reviewed    7. Last Urine Drug Screen  Date of Last Screen: None on file     8. Controlled Substance Agreement:  Date of the Last Agreement: None on file     9. Follow-up with this provider in 7 weeks.     10. -Total time: 23 minutes via in person     - Follow up with physical health providers as scheduled  - May follow up  sooner if experiences worsening symptoms by calling  Psychiatry at [phone]  - Patient verbalized an understanding to call Mobile Crisis at [phone] ([facility]), [phone], or [phone]/go to the nearest emergency room if experiences thoughts of harm to self or others.      Size Of Lesion In Cm (Optional): 0 Introduction Text (Please End With A Colon): The following procedure was deferred: Detail Level: Detailed Scheduling Instructions (Optional): scheduled cosmetic shave removal

## 2025-06-02 ENCOUNTER — APPOINTMENT (OUTPATIENT)
Dept: BEHAVIORAL HEALTH | Facility: CLINIC | Age: 55
End: 2025-06-02
Payer: COMMERCIAL

## 2025-06-02 VITALS
SYSTOLIC BLOOD PRESSURE: 127 MMHG | BODY MASS INDEX: 23.19 KG/M2 | HEART RATE: 89 BPM | DIASTOLIC BLOOD PRESSURE: 84 MMHG | WEIGHT: 180.7 LBS | HEIGHT: 74 IN

## 2025-06-02 DIAGNOSIS — G47.9 SLEEP DISTURBANCE: ICD-10-CM

## 2025-06-02 DIAGNOSIS — F41.9 ANXIETY: ICD-10-CM

## 2025-06-02 DIAGNOSIS — F32.A MILD DEPRESSION: ICD-10-CM

## 2025-06-02 PROCEDURE — 3008F BODY MASS INDEX DOCD: CPT

## 2025-06-02 PROCEDURE — 99214 OFFICE O/P EST MOD 30 MIN: CPT

## 2025-06-02 RX ORDER — SERTRALINE HYDROCHLORIDE 50 MG/1
TABLET, FILM COATED ORAL
Qty: 34 TABLET | Refills: 0 | Status: SHIPPED | OUTPATIENT
Start: 2025-06-16 | End: 2025-07-23

## 2025-06-02 RX ORDER — TRAZODONE HYDROCHLORIDE 100 MG/1
100 TABLET ORAL NIGHTLY PRN
Qty: 90 TABLET | Refills: 1 | Status: SHIPPED | OUTPATIENT
Start: 2025-06-02

## 2025-06-02 RX ORDER — VENLAFAXINE HYDROCHLORIDE 37.5 MG/1
CAPSULE, EXTENDED RELEASE ORAL
Qty: 42 CAPSULE | Refills: 0 | Status: SHIPPED | OUTPATIENT
Start: 2025-06-03 | End: 2025-06-24

## 2025-06-02 NOTE — PROGRESS NOTES
"Thomas Muñoz is a 54 y.o. male patient presenting for a(an) in person FUV  Visit location: patient (Thomas Muñoz, [place] office), provider (MARY Randall, [place] office)  Pt identify self by name, , and address    Chief Complaint   Patient presents with    Anxiety    Depression    Sleeping Problem    Follow-up    Med Management      HPI    2025  \"We increased the medication the last time and it didn't go too well. It was good, wasn't helping, didn't help with high anxiety in the morning. HR today seems [place] high. Anxiety remains high in the mornings. Went back down to 150 mg. Continue to clench my teeth.\"   \"Was sleeping well until I backed out the high dose.\"   Denies mood swings (\"sometimes short with people\")  Denies panic attacks, but express high anxiety.    Sleep fluctuation corresponds with adjustment to Effexor  Denies substance use.    Denies SI/HI/AVH/delusions/sumaya/hypomania.    Current S/Sx:  -Mood swings: denies  -Depressive mood: Improving.  PHQ-9 (2)  -Fatigue/Energy: Improving  -Feeling hope/help/worthless: Denies  -Sleep: sleeps well  -Motivation: Improving  -Appetite/Weight Changes: good appetite, no weight changes  -Psychosis: denies hallucinations/delusions  -SI/HI: Denies denies current suicidal intent/plan  -Guns/Weapons at home: in safe keeping     -Worry excessively: Mostly managed except for the first 2 hours of the day  EARLE-7 (3)     Panic attacks  Denies recent     HISTORY  PSYCH HISTORY  -Psych Hx: depression, anxiety, insomnia  -Psych Hospitalization Hx: denies  -Suicide Attempt Hx: denies  -Self-Harm/Violence Hx: denies  -Current psych meds:   -Psych Med Hx: Hydroxyzine 10 mg 3 times/day (Takes 1 tab at bedtime to help with sleep), Effexor (ineffective at high dose), Zoloft 25 mg (caused insomnia), Mirtazapine (ineffective), Wellbutrin (bad side effects), Depakote (severe insomnia)     SUBSTANCE USE HISTORY  -Substance Use Hx: denies  -ETOH: " denies  -Tobacco: denies  -Caffeine: pop, 1-2 during the day  -Substance Abuse Treatment Hx: denies     FAMILY HISTORY  -Family Psych Hx: mother: some mental issues, daughter: depression/anxiety since 7  -Family Suicide Hx: denies  -Family Substance Abuse Hx: denies     SOCIAL HISTORY  -Upbringing: Grew up with both parents. Has 2 brothers  -Support system: good support system  -Trauma: some emotional   -Education: Masters  -Work: , self employed  -Marital Status:   -Children: 1 daughter  -Living situation: house with wife and daughter  -: denies  -Legal: denies      MEDICAL HISTORY  -PCP: Arnold Waldron, DO  -TBI/head trauma/LOC/seizure hx: denies     REVIEW OF SYSTEMS  Review of Systems   All other systems reviewed and are negative.       PHYSICAL EXAM  Physical Exam  Psychiatric:         Attention and Perception: Attention and perception normal.         Mood and Affect: Mood and affect normal.         Speech: Speech normal.         Behavior: Behavior normal. Behavior is cooperative.         Thought Content: Thought content normal.         Cognition and Memory: Cognition and memory normal.         Judgment: Judgment normal.          IMPRESSION  FUV today via in person.  Thomas continue to struggle with elevated anxiety and intermittent mild depressive episodes on current dose of Effexor.  He attempted the increased dose of Effexor 225 mg as previously instructed, but reports suffering from side effects including teeth clenching, unsure of the exact manifestation, decrease dose back to 150 mg daily.  Continue to struggle with unmanaged anxiety in the mornings.  Reports some sleep disturbance due to Effexor dose adjustment.  Appetite is normal.  Denies panic attacks, denies mood swings, but reports mild irritable mood.   Continue to take trazodone and hydroxyzine at bedtime to improve sleep.  Reports trying Ativan a few times but did not see any benefits from it, so stopped using it,  prefers hydroxyzine.  Denies substance use.  Denies SI/HI/AVH/delusions/sumaya/hypomania. Willing to retry Zoloft previously prescribed by different provider, was effective for depression, but caused insomnia (daytime was not on trazodone).  Discussed to continue hydroxyzine and trazodone as before. Will follow up with this provider in 4 weeks to continue monitoring, or sooner if needed.      Plan:     1. Reviewed diagnostic impression including subjective and objective data and provided education about depression, Anxiety, Panic attacks, and Sleep disturbance, etiology, treatment recommendations including medication, therapy, course of treatment and prognosis. Patient amenable to treatment plan.     2. Safety Assessment: History of no thoughts, but denies current thoughts of SI, plan/intent.  No h/o SA. RF include , male, and access to weapons. PF include  strong coping skills, sense of responsibility towards family, positive family relationships, hopefulness/future orientation, marriage/partnership, and employment, engaged in care, future oriented, reports the presence of guns at home, locked up and safety..  Low imminent risk     3. @  Problem List Items Addressed This Visit       Anxiety    Relevant Medications    venlafaxine XR (Effexor XR) 37.5 mg 24 hr capsule (Start on 6/3/2025)    sertraline (Zoloft) 50 mg tablet (Start on 6/16/2025)    Mild depression    Relevant Medications    venlafaxine XR (Effexor XR) 37.5 mg 24 hr capsule (Start on 6/3/2025)    sertraline (Zoloft) 50 mg tablet (Start on 6/16/2025)    Sleep disturbance    Relevant Medications    sertraline (Zoloft) 50 mg tablet (Start on 6/16/2025)    traZODone (Desyrel) 100 mg tablet     CONTINUE Trazodone 100 mg, Take 0.5-1 tabs at bedtime prn for sleep.   CONTINUE Hydroxyzine 10 mg TID prn for severe anxiety  DECREASE venlafaxine XR 37.5 mg, take 3 capsules (112.5 mg) daily x 7 days, then 2 capsules (75 mg) daily x 7 days, then 1 capsule  (37.5 mg) daily until next visit  START Zoloft 50 mg, take 0.5 tabs (25 mg) daily x 7 days, then 1 tabs (50 mg) daily until next visit starting 6/16/2025  HOLD Lorazepam 0.5 mg daily as needed for severely elevated anxiety or panic attacks     Reviewed r/b/a, possible side effects of the medication. Client is aware about the benefit outweighs the risk.     4. INDIVIDUAL THERAPY: Not currently attending     5. OARRS: last filled Ativan 0.5 mg on 04/14/2025 (15 tabs x 5 days)     6. Labs reviewed    7. Last Urine Drug Screen  Date of Last Screen: None on file     8. Controlled Substance Agreement:  Date of the Last Agreement: None on file     9. Follow-up with this provider in 4 weeks.     10. -Total time: 27 minutes via in person     - Follow up with physical health providers as scheduled  - May follow up sooner if experiences worsening symptoms by calling  Psychiatry at [phone]  - Patient verbalized an understanding to call [place] [facility] at [phone] ([facility]), [phone], or [phone]/go to the nearest emergency room if experiences thoughts of harm to self or others.

## 2025-06-20 ENCOUNTER — APPOINTMENT (OUTPATIENT)
Dept: PRIMARY CARE | Facility: CLINIC | Age: 55
End: 2025-06-20
Payer: COMMERCIAL

## 2025-06-30 ENCOUNTER — APPOINTMENT (OUTPATIENT)
Dept: BEHAVIORAL HEALTH | Facility: CLINIC | Age: 55
End: 2025-06-30
Payer: COMMERCIAL

## 2025-07-08 ENCOUNTER — APPOINTMENT (OUTPATIENT)
Dept: PRIMARY CARE | Facility: CLINIC | Age: 55
End: 2025-07-08
Payer: COMMERCIAL

## 2025-07-08 VITALS
HEIGHT: 74 IN | RESPIRATION RATE: 18 BRPM | OXYGEN SATURATION: 97 % | DIASTOLIC BLOOD PRESSURE: 70 MMHG | WEIGHT: 185 LBS | TEMPERATURE: 97 F | BODY MASS INDEX: 23.74 KG/M2 | SYSTOLIC BLOOD PRESSURE: 110 MMHG | HEART RATE: 85 BPM

## 2025-07-08 DIAGNOSIS — Z12.5 SCREENING FOR PROSTATE CANCER: ICD-10-CM

## 2025-07-08 DIAGNOSIS — E78.49 OTHER HYPERLIPIDEMIA: Primary | ICD-10-CM

## 2025-07-08 DIAGNOSIS — N50.811 TESTICULAR PAIN, RIGHT: ICD-10-CM

## 2025-07-08 DIAGNOSIS — T14.8XXA ABRASION: ICD-10-CM

## 2025-07-08 DIAGNOSIS — F32.89 OTHER DEPRESSION: ICD-10-CM

## 2025-07-08 PROCEDURE — 90715 TDAP VACCINE 7 YRS/> IM: CPT | Performed by: FAMILY MEDICINE

## 2025-07-08 PROCEDURE — 3008F BODY MASS INDEX DOCD: CPT | Performed by: FAMILY MEDICINE

## 2025-07-08 PROCEDURE — 99213 OFFICE O/P EST LOW 20 MIN: CPT | Performed by: FAMILY MEDICINE

## 2025-07-08 PROCEDURE — 90471 IMMUNIZATION ADMIN: CPT | Performed by: FAMILY MEDICINE

## 2025-07-08 PROCEDURE — 1036F TOBACCO NON-USER: CPT | Performed by: FAMILY MEDICINE

## 2025-07-08 ASSESSMENT — PATIENT HEALTH QUESTIONNAIRE - PHQ9
2. FEELING DOWN, DEPRESSED OR HOPELESS: NOT AT ALL
1. LITTLE INTEREST OR PLEASURE IN DOING THINGS: NOT AT ALL
SUM OF ALL RESPONSES TO PHQ9 QUESTIONS 1 AND 2: 0

## 2025-07-08 ASSESSMENT — ENCOUNTER SYMPTOMS
BRUISES/BLEEDS EASILY: 0
POLYDIPSIA: 0
FATIGUE: 0
DIZZINESS: 0
SORE THROAT: 0
POLYPHAGIA: 0
ARTHRALGIAS: 0
BACK PAIN: 0
CHEST TIGHTNESS: 0
COUGH: 0
FREQUENCY: 0
BLOOD IN STOOL: 0
ABDOMINAL PAIN: 0
HEMATURIA: 0
SINUS PAIN: 0

## 2025-07-08 NOTE — PROGRESS NOTES
Subjective   Patient ID: Thomas Muñoz is a 54 y.o. male who presents for Follow-up.  HPI   no longer with testicular pain.....  Multiple abrasions at work and last tetanus 6 years ago    Would like to get labs has not had done    Still seeing psychiatry is stable with meds  12/24  testicular  pain and  with squating down  had  pain  in testicle.  Patient noticed pain in the testicle was precipitated by wearing tight jeans.  He has reduced/stopped this and that bending over it seemed to cause irritation to the testicle and this was the factor while doing strenuous work and he no longer wears tight jeans with this type of activity.   Review of Systems   Constitutional:  Negative for fatigue.   HENT:  Negative for ear pain, sinus pain and sore throat.    Respiratory:  Negative for cough and chest tightness.    Cardiovascular:  Negative for chest pain.   Gastrointestinal:  Negative for abdominal pain and blood in stool.   Endocrine: Negative for cold intolerance, heat intolerance, polydipsia, polyphagia and polyuria.   Genitourinary:  Negative for enuresis, frequency, genital sores, hematuria, penile pain, scrotal swelling and testicular pain.   Musculoskeletal:  Negative for arthralgias and back pain.   Skin:  Negative for rash.   Neurological:  Negative for dizziness.   Hematological:  Does not bruise/bleed easily.       Objective   Physical Exam  Vitals: I have reviewed the vitals  General: Well-developed.  In no acute distress.  Eyes:   sclera nonicteric.  Conjunctiva not injected.  No discharge.   HEAD: Normocephalic, atraumatic.  HEENT   Mucous membranes moist.  Posterior oropharynx nonerythematous, no tonsillar exudates.      No cervical lymphadenopathy.  Cardio: Regular rate and rhythm.  No murmur, rub or gallop.  Pulmonary: Lungs clear to auscultation in all fields.  No accessory muscle use.  GI/: Normal active bowel sounds.  Soft, nontender.  No masses or organomegaly appreciated.  Musculoskeletal: No gross  deformities appreciated.  Neuro: Alert, age-appropriate.  Normal muscle tone.  Moving all extremities.  Skin: No rash, bruises or lesions.   Labs    Review of sonogram dated 4/13/2022 the testicle showed no testicular mass right epididymal head 2 small epididymal head cysts no testicular mass no hydrocele small left-sided varicocele    Assessment/Plan   Problem List Items Addressed This Visit       Hyperlipidemia - Primary    Relevant Orders    Comprehensive Metabolic Panel    Lipid Panel    Testicular pain, right    Pain remains resolved with choices of different clothing i.e. no longer tight jeans         Abrasion    Tetanus immunization given          Other Visit Diagnoses         Screening for prostate cancer        Relevant Orders    Prostate Specific Antigen, Screen      Other depression        Relevant Orders    CBC and Auto Differential    Thyroid Stimulating Hormone

## 2025-07-08 NOTE — PATIENT INSTRUCTIONS

## 2025-07-14 DIAGNOSIS — F32.A MILD DEPRESSION: ICD-10-CM

## 2025-07-14 DIAGNOSIS — F41.9 ANXIETY: ICD-10-CM

## 2025-07-14 RX ORDER — SERTRALINE HYDROCHLORIDE 100 MG/1
100 TABLET, FILM COATED ORAL DAILY
Qty: 30 TABLET | Refills: 1 | Status: SHIPPED | OUTPATIENT
Start: 2025-07-14 | End: 2025-09-12

## 2025-08-05 ENCOUNTER — APPOINTMENT (OUTPATIENT)
Dept: BEHAVIORAL HEALTH | Facility: CLINIC | Age: 55
End: 2025-08-05
Payer: COMMERCIAL

## 2025-08-05 DIAGNOSIS — F41.9 ANXIETY: ICD-10-CM

## 2025-08-05 DIAGNOSIS — G47.9 SLEEP DISTURBANCE: ICD-10-CM

## 2025-08-05 DIAGNOSIS — F32.A MILD DEPRESSION: ICD-10-CM

## 2025-08-05 PROCEDURE — 99214 OFFICE O/P EST MOD 30 MIN: CPT

## 2025-08-05 RX ORDER — SERTRALINE HYDROCHLORIDE 100 MG/1
100 TABLET, FILM COATED ORAL DAILY
Qty: 90 TABLET | Refills: 1 | Status: SHIPPED | OUTPATIENT
Start: 2025-08-05 | End: 2026-02-01

## 2025-08-05 NOTE — PROGRESS NOTES
Thomas Muñoz is a 54 y.o. male patient presenting for a(an) virtual FUV  Visit location: patient (Thomas Muñoz, home office), provider (MARY Randall, [place] office)  Pt identify self by name, , and address    Chief Complaint   Patient presents with    Anxiety    Depression    Sleeping Problem    Follow-up    Med Management      HPI    2025  Reports some improvement with anxiety, still struggle with feeling overwhelmed in the morning   Reports increased stressor in the last 1.5 months - but things will improve soon  Happy with current regimen, express interest to stay here for now  Sleep - takes Hydroxyzine and Trazodone to improve sleep  Continue to take Zoloft 100 mg daily - managed anxiety/depression with minimal insomnia, otherwise, no other side effects or concerns  Denies mood swings or panic attacks  Appetite - normal  Denies substance use.    Denies SI/HI/AVH/delusions/sumaya/hypomania.    Current S/Sx:  -Mood swings: denies  -Depressive mood: Improving.  PHQ-9 (2)  -Fatigue/Energy: Improving  -Feeling hope/help/worthless: Denies  -Sleep: sleeps well  -Motivation: Improving  -Appetite/Weight Changes: good appetite, no weight changes  -Psychosis: denies hallucinations/delusions  -SI/HI: Denies denies current suicidal intent/plan  -Guns/Weapons at home: in safe keeping     -Worry excessively: Mostly managed except for the first 2 hours of the day  EARLE-7 (3)     Panic attacks  Denies recent     HISTORY  PSYCH HISTORY  -Psych Hx: depression, anxiety, insomnia  -Psych Hospitalization Hx: denies  -Suicide Attempt Hx: denies  -Self-Harm/Violence Hx: denies  -Current psych meds:   -Psych Med Hx: Hydroxyzine 10 mg 3 times/day (Takes 1 tab at bedtime to help with sleep), Effexor (ineffective at high dose), Zoloft 25 mg (caused insomnia), Mirtazapine (ineffective), Wellbutrin (bad side effects), Depakote (severe insomnia)     SUBSTANCE USE HISTORY  -Substance Use Hx: denies  -ETOH:  denies  -Tobacco: denies  -Caffeine: pop, 1-2 during the day  -Substance Abuse Treatment Hx: denies     FAMILY HISTORY  -Family Psych Hx: mother: some mental issues, daughter: depression/anxiety since 7  -Family Suicide Hx: denies  -Family Substance Abuse Hx: denies     SOCIAL HISTORY  -Upbringing: Grew up with both parents. Has 2 brothers  -Support system: good support system  -Trauma: some emotional   -Education: Masters  -Work: , self employed  -Marital Status:   -Children: 1 daughter  -Living situation: house with wife and daughter  -: denies  -Legal: denies      MEDICAL HISTORY  -PCP: Arnold Waldron, DO  -TBI/head trauma/LOC/seizure hx: denies     REVIEW OF SYSTEMS  Review of Systems   All other systems reviewed and are negative.       PHYSICAL EXAM  Physical Exam    Psychiatric:         Attention and Perception: Attention and perception normal.         Mood and Affect: Mood and affect normal.         Speech: Speech normal.         Behavior: Behavior normal. Behavior is cooperative.         Thought Content: Thought content normal.         Cognition and Memory: Cognition and memory normal.         Judgment: Judgment normal.          IMPRESSION  FUV today via in virtual.  Thomas reports significant improvement with anxiety, mild depression, and sleep since bridging from Effexor to Zoloft, currently taking 100 mg daily, also takes trazodone and hydroxyzine as needed for sleep and elevated anxiety respectively with no noticeable side effects from any of his medications or other concerns.  Denies panic attacks or mood swings.  Reports initial sleep disturbance when for starting sertraline, but symptoms have since resolved.  Patient attributes current stressors to increased work, but notes his work schedule will be easing up soon.   Denies SI/HI/AVH/delusions/sumaya/hypomania.  Denies substance use.  Sleep/appetite-normal.  Discussed to continue on current regimen as before.  Will follow  up with this provider in 8 weeks to continue monitoring, or sooner if needed.      Plan:     1. Reviewed diagnostic impression including subjective and objective data and provided education about depression, Anxiety, Panic attacks, and Sleep disturbance, etiology, treatment recommendations including medication, therapy, course of treatment and prognosis. Patient amenable to treatment plan.     2. Safety Assessment: History of no thoughts, but denies current thoughts of SI, plan/intent.  No h/o SA. RF include , male, and access to weapons. PF include  strong coping skills, sense of responsibility towards family, positive family relationships, hopefulness/future orientation, marriage/partnership, and employment, engaged in care, future oriented, reports the presence of guns at home, locked up and safety..  Low imminent risk     3. @  Problem List Items Addressed This Visit       Anxiety    Relevant Medications    sertraline (Zoloft) 100 mg tablet    Mild depression    Relevant Medications    sertraline (Zoloft) 100 mg tablet    Sleep disturbance     CONTINUE Trazodone 100 mg, Take 0.5-1 tabs at bedtime prn for sleep.   CONTINUE Hydroxyzine 10 mg TID prn for severe anxiety  CONTINUE Zoloft 100 mg daily   HOLD Lorazepam 0.5 mg daily as needed for severely elevated anxiety or panic attacks     Reviewed r/b/a, possible side effects of the medication. Client is aware about the benefit outweighs the risk.     4. INDIVIDUAL THERAPY: Not currently attending     5. OARRS: last filled Ativan 0.5 mg on 04/14/2025 (15 tabs x 5 days)     6. Labs reviewed    7. Last Urine Drug Screen  Date of Last Screen: None on file     8. Controlled Substance Agreement:  Date of the Last Agreement: None on file     9. Follow-up with this provider in 8 weeks.     10. -Total time: 22 minutes via virtual     - Follow up with physical health providers as scheduled  - May follow up sooner if experiences worsening symptoms by calling   Psychiatry at [phone]  - Patient verbalized an understanding to call Mobile Crisis at [phone] ([facility]), 211, or 211/go to the nearest emergency room if experiences thoughts of harm to self or others.

## 2025-09-30 ENCOUNTER — APPOINTMENT (OUTPATIENT)
Dept: BEHAVIORAL HEALTH | Facility: CLINIC | Age: 55
End: 2025-09-30
Payer: COMMERCIAL

## 2026-01-08 ENCOUNTER — APPOINTMENT (OUTPATIENT)
Dept: PRIMARY CARE | Facility: CLINIC | Age: 56
End: 2026-01-08
Payer: COMMERCIAL